# Patient Record
Sex: MALE | Race: WHITE | NOT HISPANIC OR LATINO | Employment: OTHER | URBAN - METROPOLITAN AREA
[De-identification: names, ages, dates, MRNs, and addresses within clinical notes are randomized per-mention and may not be internally consistent; named-entity substitution may affect disease eponyms.]

---

## 2017-10-18 PROBLEM — Z86.73 PERSONAL HISTORY OF TRANSIENT ISCHEMIC ATTACK (TIA), AND CEREBRAL INFARCTION WITHOUT RESIDUAL DEFICITS: Status: ACTIVE | Noted: 2017-10-18

## 2017-10-18 PROBLEM — I10 ESSENTIAL (PRIMARY) HYPERTENSION: Status: ACTIVE | Noted: 2017-10-18

## 2018-02-23 ENCOUNTER — APPOINTMENT (OUTPATIENT)
Dept: ONCOLOGY | Facility: CLINIC | Age: 68
End: 2018-02-23
Payer: COMMERCIAL

## 2018-02-23 ENCOUNTER — OFFICE VISIT (OUTPATIENT)
Dept: ONCOLOGY | Facility: CLINIC | Age: 68
End: 2018-02-23
Payer: COMMERCIAL

## 2018-02-23 VITALS
DIASTOLIC BLOOD PRESSURE: 93 MMHG | TEMPERATURE: 99.1 F | BODY MASS INDEX: 25.3 KG/M2 | HEART RATE: 58 BPM | WEIGHT: 166.4 LBS | OXYGEN SATURATION: 97 % | SYSTOLIC BLOOD PRESSURE: 194 MMHG

## 2018-02-23 DIAGNOSIS — C83.398 DIFFUSE LARGE B-CELL LYMPHOMA, EXTRANODAL AND SOLID ORGAN SITES: ICD-10-CM

## 2018-02-23 LAB
ALBUMIN SERPL-MCNC: 4.4 G/DL (ref 3.5–5.3)
ALP SERPL-CCNC: 104 U/L (ref 45–115)
ALT SERPL-CCNC: 17 U/L (ref 0–52)
ANION GAP SERPL CALC-SCNC: 8 MMOL/L (ref 3–11)
AST SERPL-CCNC: 19 U/L (ref 0–39)
BILIRUB SERPL-MCNC: 0.9 MG/DL (ref 0–1.4)
BUN SERPL-MCNC: 27 MG/DL (ref 7–25)
CALCIUM ALBUM COR SERPL-MCNC: 9.2 MG/DL (ref 8.5–10.1)
CALCIUM SERPL-MCNC: 9.5 MG/DL (ref 8.6–10.3)
CHLORIDE SERPL-SCNC: 103 MMOL/L (ref 98–107)
CO2 SERPL-SCNC: 27 MMOL/L (ref 21–32)
CREAT SERPL-MCNC: 1.4 MG/DL (ref 0.8–1.3)
GFR SERPL CREATININE-BSD FRML MDRD: 51 ML/MIN/1.73M*2
GLUCOSE SERPL-MCNC: 86 MG/DL (ref 70–105)
POTASSIUM SERPL-SCNC: 4 MMOL/L (ref 3.5–5.1)
PROT SERPL-MCNC: 7.2 G/DL (ref 6–8.3)
SODIUM SERPL-SCNC: 138 MMOL/L (ref 135–145)

## 2018-02-23 PROCEDURE — 80053 COMPREHEN METABOLIC PANEL: CPT

## 2018-02-23 PROCEDURE — 36415 COLL VENOUS BLD VENIPUNCTURE: CPT

## 2018-02-23 PROCEDURE — 99212 OFFICE O/P EST SF 10 MIN: CPT

## 2018-02-23 PROCEDURE — 99214 OFFICE O/P EST MOD 30 MIN: CPT | Performed by: INTERNAL MEDICINE

## 2018-02-23 ASSESSMENT — PAIN SCALES - GENERAL: PAINLEVEL: 0-NO PAIN

## 2018-02-23 NOTE — PROGRESS NOTES
Medical Oncology Follow-Up    Date of Service: 2/23/2018    Subjective   HISTORY OF PRESENT ILLNESS:  This is a 66-year-old male with non-Hodgkin's lymphoma.  The patient returns to  oncology clinic for followup.     The patient was diagnosed with non-Hodgkin's lymphoma when he underwent surgery  for small-bowel obstruction in February 2017.  He was found to have both  low-grade as well as diffuse large B-cell lymphoma involving the bowel.  When I  saw the patient in March 2017, his PET scan showed FDG avid large mesenteric  mass.  His bone marrow biopsy and brain imagings were negative.  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  Our purpose was to  eradicate the high-grade component of his lymphoma.  He received 6 cycles of R-CHOP between March and July 2017.  The PET scan done in August 2017 showed complete response.  Since then he has remained in remission.  Today patient returns oncology clinic for follow-up.    The patient states that in the last 3 months, he has done well.  He has not had any new health issues.  He denies any suspicious symptoms such as enlarging lymph nodes, fevers, chills, night sweats or unexplained weight loss.  He feels well with good energy level.      PAST MEDICAL HISTORY:   Past Medical History:   Diagnosis Date   • Diffuse large b-cell lymphoma, extranodal and solid organ sites (CMS/HCC)    • Hypertension    • Lymphoma (CMS/HCC)    • Personal history of transient ischemic attack         FAMILY HISTORY:   Family History   Problem Relation Age of Onset   • Multiple sclerosis Mother    • Heart disease Mother    • Lung cancer Father    • Prostate cancer Father    • Cancer Father         SOCIAL HISTORY:   Social History     Social History   • Marital status:      Spouse name: N/A   • Number of children: N/A   • Years of education: N/A     Occupational History   • Not on file.      Social History Main Topics   • Smoking status: Never Smoker   • Smokeless tobacco: Former User     Types: Chew      Comment: Age started: 22: age stopped: 51   • Alcohol use No   • Drug use: No   • Sexual activity: Not on file     Other Topics Concern   • Not on file     Social History Narrative   • No narrative on file        ALLERGIES:   Allergies as of 02/23/2018 - Reviewed 02/23/2018   Allergen Reaction Noted   • Shrimp  03/21/2017        MEDICATIONS:   Current Outpatient Prescriptions   Medication Sig Dispense Refill   • allopurinol (ZYLOPRIM) 300 mg tablet      • amLODIPine (NORVASC) 10 mg tablet      • aspirin 325 mg tablet      • lisinopril (PRINIVIL,ZESTRIL) 20 mg tablet      • losartan (COZAAR) 100 mg tablet      • metoprolol succinate XL (TOPROL-XL) 25 mg 24 hr tablet      • citalopram (CeleXA) 20 mg tablet      • dicloxacillin (DYNAPEN) 250 mg capsule      • docusate sodium (COLACE) 100 mg capsule      • LORazepam (ATIVAN) 0.5 mg tablet      • predniSONE (DELTASONE) 50 mg tablet      • prochlorperazine (COMPAZINE) 10 mg tablet      • sodium chloride (SALINE NASAL) 0.65 % nasal spray        No current facility-administered medications for this visit.        REVIEW OF SYSTEMS:   14 point systems review was done.  It was negative    The ECOG performance status today is 0          Objective    PHYSICAL EXAM:   BP (!) 194/93   Pulse 58   Temp 37.3 °C (99.1 °F) (Temporal)   Wt 75.5 kg (166 lb 6.4 oz)   SpO2 97%   BMI 25.30 kg/m²    Body mass index is 25.3 kg/m².     Overall patient looked well.  No distress    HEENT: No scleral icterus.  No oral or pharyngeal lesions    Ln: No lymphadenopathy in neck axilla or groin    Lungs: Clear    CVS: Regular rate and rhythm.  No S3.  No friction rub    Abdomen: Benign nontender.  No hepatosplenomegaly.  No palpable abnormalities    Ext: No edema.  Pulses intact    Musculoskeletal: No muscle or bone tenderness including spine pelvis    Skin: No petechia purpura  rash    Neuro: Alert oriented ×3.  No focal deficits        Physical Exam    DIAGNOSTIC REPORTS REVIEWED:   CBC today shows a white cell count of 6.0, hemoglobin of 13.8 and platelet count of 205,000 all within normal limits.  The comprehensive metabolic panel was normal with the exception of slightly elevated BUN and creatinine at 27 and 1.4.           Assessment/Plan    IMPRESSION and PLAN:   This is a 67-year-old male with history of non-Hodgkin's lymphoma.  The patient returns oncology clinic for follow-up.    The patient appears to have done well in the last 3 months.  He does not have signs and symptoms of recurrence of his lymphoma.  His examination is within normal limits.  His labs are also normal with the exception of slightly elevated BUN and creatinine which were normal 3 months ago in November 2017.  The patient has history of high blood pressure and apparently he did not take his blood pressure medication today as a result his blood pressure is quite high.  I suspect that uncontrolled blood pressure may have played a role in his mild kidney dysfunction.  I will advised him to take his blood pressure medication and contact his primary care provider for further workup and treatment for his kidney dysfunction as soon as possible.  Otherwise he seems to be doing well and appears to be in remission.  As a result I will see him back in 3 months for follow-up.    I had a fairly long conversation with the patient.  I reviewed his labs.  I told him that from the lymphoma standpoint he seems to be doing well.  We then spent some time talking about his kidney dysfunction and uncontrolled blood pressure and I advised him to take his blood pressure medication and contact his primary care physician as soon as possible.  I also answered his questions.  The patient verbalizes understanding and approval of my recommendations and ongoing therapy.  As a result he will return in 3 months for follow-up.                Physician: M. BEHNAN SAHIN, MD

## 2018-05-31 ENCOUNTER — APPOINTMENT (OUTPATIENT)
Dept: ONCOLOGY | Facility: CLINIC | Age: 68
End: 2018-05-31
Payer: COMMERCIAL

## 2018-05-31 ENCOUNTER — OFFICE VISIT (OUTPATIENT)
Dept: ONCOLOGY | Facility: CLINIC | Age: 68
End: 2018-05-31
Payer: COMMERCIAL

## 2018-05-31 VITALS
HEIGHT: 68 IN | BODY MASS INDEX: 25.01 KG/M2 | DIASTOLIC BLOOD PRESSURE: 89 MMHG | WEIGHT: 165 LBS | OXYGEN SATURATION: 100 % | HEART RATE: 62 BPM | TEMPERATURE: 98.7 F | SYSTOLIC BLOOD PRESSURE: 154 MMHG

## 2018-05-31 DIAGNOSIS — C83.398 DIFFUSE LARGE B-CELL LYMPHOMA, EXTRANODAL AND SOLID ORGAN SITES: ICD-10-CM

## 2018-05-31 PROCEDURE — 99212 OFFICE O/P EST SF 10 MIN: CPT

## 2018-05-31 PROCEDURE — 99214 OFFICE O/P EST MOD 30 MIN: CPT | Performed by: INTERNAL MEDICINE

## 2018-05-31 NOTE — PROGRESS NOTES
Medical Oncology Follow-Up    Date of Service: 5/31/2018    Subjective   HISTORY OF PRESENT ILLNESS:  This is a 66-year-old male with non-Hodgkin's lymphoma.  The patient returns to  oncology clinic for followup.     The patient was diagnosed with non-Hodgkin's lymphoma when he underwent surgery forsmall-bowel obstruction in February 2017.  He was found to have both low-grade as well as diffuse large B-cell lymphoma involving the bowel.  When I saw the patient in March 2017, his PET scan showed FDG avid large mesenteric  mass.  His bone marrow biopsy and brain imagings were negative.  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  Our purpose was to eradicate the high-grade component of his lymphoma.  He received 6 cycles of R-CHOP between March and July 2017.  The PET scan done in August 2017 showed complete response.  Since then he has remained in remission.  Today patient returns oncology clinic for follow-up.    The patient states that she has done well since he was seen in the clinic in February without any new health issues.  He denies any suspicious symptoms such as enlarging lymph nodes, fevers, chills, night sweats or weight loss.  He has good appetite and good energy level.  He states that his kidney dysfunction we discovered in February has improved with better blood pressure control.          PAST MEDICAL HISTORY:   Past Medical History:   Diagnosis Date   • Diffuse large b-cell lymphoma, extranodal and solid organ sites (CMS/HCC)    • Hypertension    • Lymphoma (CMS/HCC)    • Personal history of transient ischemic attack         FAMILY HISTORY:   Family History   Problem Relation Age of Onset   • Multiple sclerosis Mother    • Heart disease Mother    • Lung cancer Father    • Prostate cancer Father    • Cancer Father         SOCIAL HISTORY:   Social History     Social History   • Marital status:   "    Spouse name: N/A   • Number of children: N/A   • Years of education: N/A     Occupational History   • Not on file.     Social History Main Topics   • Smoking status: Never Smoker   • Smokeless tobacco: Former User     Types: Chew      Comment: Age started: 22: age stopped: 51   • Alcohol use No   • Drug use: No   • Sexual activity: Not on file     Other Topics Concern   • Not on file     Social History Narrative   • No narrative on file        ALLERGIES:   Allergies as of 05/31/2018 - Reviewed 05/31/2018   Allergen Reaction Noted   • Shrimp  03/21/2017        MEDICATIONS:   Current Outpatient Prescriptions   Medication Sig Dispense Refill   • allopurinol (ZYLOPRIM) 300 mg tablet      • amLODIPine (NORVASC) 10 mg tablet      • aspirin 325 mg tablet      • citalopram (CeleXA) 20 mg tablet      • dicloxacillin (DYNAPEN) 250 mg capsule      • docusate sodium (COLACE) 100 mg capsule      • lisinopril (PRINIVIL,ZESTRIL) 20 mg tablet      • LORazepam (ATIVAN) 0.5 mg tablet      • losartan (COZAAR) 100 mg tablet      • metoprolol succinate XL (TOPROL-XL) 25 mg 24 hr tablet      • predniSONE (DELTASONE) 50 mg tablet      • prochlorperazine (COMPAZINE) 10 mg tablet      • sodium chloride (SALINE NASAL) 0.65 % nasal spray        No current facility-administered medications for this visit.        REVIEW OF SYSTEMS:   A 14 point systems review was done.  It was negative    The ECOG performance status today is 0          Objective    PHYSICAL EXAM:   /89   Pulse 62   Temp 37.1 °C (98.7 °F) (Temporal)   Ht 1.727 m (5' 8\")   Wt 74.8 kg (165 lb)   SpO2 100%   BMI 25.09 kg/m²    Body mass index is 25.09 kg/m².     Overall patient looked well.  No distress     HEENT: No scleral icterus.  No oral or pharyngeal lesions     Ln: No lymphadenopathy in neck axilla or groin     Lungs: Clear     CVS: Regular rate and rhythm.  No S3.  No friction rub     Abdomen: Benign nontender.  No hepatosplenomegaly.  No palpable " abnormalities     Ext: No edema.  Pulses intact     Musculoskeletal: No muscle or bone tenderness including spine pelvis     Skin: No petechia purpura rash     Neuro: Alert oriented ×3.  No focal deficits      Physical Exam    DIAGNOSTIC REPORTS REVIEWED:   CBC today shows a white cell count of 6.7, hemoglobin of 13.6 and platelet count 196,000.  The comprehensive metabolic panel including LDH, BUN and creatinine was normal.           Assessment/Plan    IMPRESSION and PLAN:   This is a 67-year-old male with non-Hodgkin's lymphoma.  The patient returns oncology clinic for follow-up.    The patient appears to have done well in the last 3 months since he was last seen in the clinic.  He has not developed any new health issues.  His mild kidney dysfunction has improved presumably with better blood pressure control and his kidney function is back to normal.  His examination is unremarkable.  His labs are within normal limits.  As a result, he continues to remain in remission nearly 1 year after the completion of his chemotherapy.  I will continue to see him every 3 months for another year after which we will decrease the frequency of clinic visits.  As discussed before, we will not obtain any routine imaging studies during follow-up.    I had a fairly long conversation with the patient.  I reviewed his labs.  I told him that his kidney dysfunction has resolved which she was pleased to hear.  Once again I reviewed the follow-up plan and answered his questions.  The patient verbalizes understanding and approval of ongoing management and my recommendations.  As a result he will return to oncology clinic in 3 months for follow-up.               Physician: M. BEHNAN SAHIN, MD

## 2018-08-30 ENCOUNTER — OFFICE VISIT (OUTPATIENT)
Dept: ONCOLOGY | Facility: CLINIC | Age: 68
End: 2018-08-30
Payer: COMMERCIAL

## 2018-08-30 ENCOUNTER — APPOINTMENT (OUTPATIENT)
Dept: ONCOLOGY | Facility: CLINIC | Age: 68
End: 2018-08-30
Payer: COMMERCIAL

## 2018-08-30 VITALS
WEIGHT: 165 LBS | OXYGEN SATURATION: 98 % | BODY MASS INDEX: 25.01 KG/M2 | SYSTOLIC BLOOD PRESSURE: 165 MMHG | TEMPERATURE: 98.1 F | HEIGHT: 68 IN | DIASTOLIC BLOOD PRESSURE: 89 MMHG | HEART RATE: 58 BPM

## 2018-08-30 DIAGNOSIS — C83.398 DIFFUSE LARGE B-CELL LYMPHOMA, EXTRANODAL AND SOLID ORGAN SITES: ICD-10-CM

## 2018-08-30 LAB
ALBUMIN SERPL-MCNC: 4.2 G/DL (ref 3.5–5.3)
ALP SERPL-CCNC: 94 U/L (ref 45–115)
ALT SERPL-CCNC: 12 U/L (ref 0–52)
ANION GAP SERPL CALC-SCNC: 7 MMOL/L (ref 3–11)
AST SERPL-CCNC: 17 U/L (ref 0–39)
BASOPHILS # BLD AUTO: 0.1 10*3/UL
BASOPHILS NFR BLD AUTO: 1 % (ref 0–2)
BILIRUB SERPL-MCNC: 1.32 MG/DL (ref 0–1.4)
BUN SERPL-MCNC: 22 MG/DL (ref 7–25)
CALCIUM ALBUM COR SERPL-MCNC: 8.9 MG/DL (ref 8.5–10.1)
CALCIUM SERPL-MCNC: 9.1 MG/DL (ref 8.6–10.3)
CHLORIDE SERPL-SCNC: 102 MMOL/L (ref 98–107)
CO2 SERPL-SCNC: 26 MMOL/L (ref 21–32)
CREAT SERPL-MCNC: 1.2 MG/DL (ref 0.8–1.3)
EOSINOPHIL # BLD AUTO: 0.4 10*3/UL
EOSINOPHIL NFR BLD AUTO: 6 % (ref 0–3)
ERYTHROCYTE [DISTWIDTH] IN BLOOD BY AUTOMATED COUNT: 13 % (ref 11.5–15)
GFR SERPL CREATININE-BSD FRML MDRD: 61 ML/MIN/1.73M*2
GLUCOSE SERPL-MCNC: 87 MG/DL (ref 70–105)
HCT VFR BLD AUTO: 38.3 % (ref 38–50)
HGB BLD-MCNC: 13.9 G/DL (ref 13.2–17.2)
LDH SERPL L TO P-CCNC: 137 U/L (ref 87–246)
LYMPHOCYTES # BLD AUTO: 1.7 10*3/UL
LYMPHOCYTES NFR BLD AUTO: 30 % (ref 15–47)
MCH RBC QN AUTO: 32.7 PG (ref 29–34)
MCHC RBC AUTO-ENTMCNC: 36.3 G/DL (ref 32–36)
MCV RBC AUTO: 90 FL (ref 82–97)
MONOCYTES # BLD AUTO: 0.9 10*3/UL
MONOCYTES NFR BLD AUTO: 16 % (ref 5–13)
NEUTROPHILS # BLD AUTO: 2.7 10*3/UL
NEUTROPHILS NFR BLD AUTO: 47 % (ref 46–70)
PLATELET # BLD AUTO: 194 10*3/UL (ref 130–350)
PMV BLD AUTO: 7.6 FL (ref 6.9–10.8)
POTASSIUM SERPL-SCNC: 4.3 MMOL/L (ref 3.5–5.1)
PROT SERPL-MCNC: 7.1 G/DL (ref 6–8.3)
RBC # BLD AUTO: 4.26 10*6/ΜL (ref 4.1–5.8)
SODIUM SERPL-SCNC: 135 MMOL/L (ref 135–145)
WBC # BLD AUTO: 5.8 10*3/UL (ref 3.7–9.6)

## 2018-08-30 PROCEDURE — 99212 OFFICE O/P EST SF 10 MIN: CPT

## 2018-08-30 PROCEDURE — 85025 COMPLETE CBC W/AUTO DIFF WBC: CPT

## 2018-08-30 PROCEDURE — 83615 LACTATE (LD) (LDH) ENZYME: CPT

## 2018-08-30 PROCEDURE — 36415 COLL VENOUS BLD VENIPUNCTURE: CPT

## 2018-08-30 PROCEDURE — 82565 ASSAY OF CREATININE: CPT

## 2018-08-30 PROCEDURE — 99214 OFFICE O/P EST MOD 30 MIN: CPT | Performed by: INTERNAL MEDICINE

## 2018-08-30 RX ORDER — ZOSTER VACCINE RECOMBINANT, ADJUVANTED 50 MCG/0.5
KIT INTRAMUSCULAR
COMMUNITY
Start: 2018-06-15 | End: 2019-11-27 | Stop reason: ALTCHOICE

## 2018-08-30 RX ORDER — FISH OIL/DHA/EPA 1200-144MG
1200 CAPSULE ORAL 2 TIMES DAILY
COMMUNITY
End: 2021-03-08 | Stop reason: ALTCHOICE

## 2018-08-30 ASSESSMENT — PAIN SCALES - GENERAL: PAINLEVEL: 0-NO PAIN

## 2018-08-30 NOTE — PROGRESS NOTES
Medical Oncology Follow-Up    Date of Service: 8/30/2018    Subjective   HISTORY OF PRESENT ILLNESS:  This is a 67-year-old male with non-Hodgkin's lymphoma.  The patient returns to  oncology clinic for followup.     The patient was diagnosed with non-Hodgkin's lymphoma when he underwent surgery forsmall-bowel obstruction in February 2017.  He was found to have both low-grade as well as diffuse large B-cell lymphoma involving the bowel.  When I saw the patient in March 2017, his PET scan showed FDG avid large mesenteric mass.  His bone marrow biopsy and brain imagings were negative.  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  Our purpose was to eradicate the high-grade component of his lymphoma.  He received 6 cycles of R-CHOP between March and July 2017.  The PET scan done in August 2017 showed complete response.  Since then he has remained in remission.  Today patient returns oncology clinic for follow-up.      The patient states that he has done well in the last 3 months without any new health issues.  He denies any suspicious symptoms such as enlarging lymph nodes, fevers, chills, night sweats or weight loss.  He also denies any abdominal complaints such as pain, nausea, vomiting, diarrhea, constipation, hematemesis, melena or bright red blood per rectum.     PAST MEDICAL HISTORY:   Past Medical History:   Diagnosis Date   • Diffuse large b-cell lymphoma, extranodal and solid organ sites (CMS/HCC)    • Hypertension    • Lymphoma (CMS/HCC)    • Personal history of transient ischemic attack         FAMILY HISTORY:   Family History   Problem Relation Age of Onset   • Multiple sclerosis Mother    • Heart disease Mother    • Lung cancer Father    • Prostate cancer Father    • Cancer Father         SOCIAL HISTORY:   Social History     Social History   • Marital status:      Spouse name: N/A   • Number of  "children: N/A   • Years of education: N/A     Occupational History   • Not on file.     Social History Main Topics   • Smoking status: Never Smoker   • Smokeless tobacco: Former User     Types: Chew      Comment: Age started: 22: age stopped: 51   • Alcohol use No   • Drug use: No   • Sexual activity: Not on file     Other Topics Concern   • Not on file     Social History Narrative   • No narrative on file        ALLERGIES:   Allergies as of 08/30/2018 - Reviewed 08/30/2018   Allergen Reaction Noted   • Shrimp  03/21/2017        MEDICATIONS:   Current Outpatient Prescriptions   Medication Sig Dispense Refill   • amLODIPine (NORVASC) 10 mg tablet      • aspirin 325 mg tablet      • citalopram (CeleXA) 20 mg tablet      • fish oil-dha-epa 1,200-144-216 mg capsule Take 1,200 mg by mouth 2 (two) times a day.     • lisinopril (PRINIVIL,ZESTRIL) 20 mg tablet      • allopurinol (ZYLOPRIM) 300 mg tablet      • dicloxacillin (DYNAPEN) 250 mg capsule      • docusate sodium (COLACE) 100 mg capsule      • LORazepam (ATIVAN) 0.5 mg tablet      • losartan (COZAAR) 100 mg tablet      • metoprolol succinate XL (TOPROL-XL) 25 mg 24 hr tablet      • predniSONE (DELTASONE) 50 mg tablet      • prochlorperazine (COMPAZINE) 10 mg tablet      • SHINGRIX, PF, 50 mcg/0.5 mL suspension for reconstitution vaccine      • sodium chloride (SALINE NASAL) 0.65 % nasal spray        No current facility-administered medications for this visit.        REVIEW OF SYSTEMS:   A 14 point systems review was done.  It was negative.    The ECOG performance status today is 0          Objective    PHYSICAL EXAM:   /89   Pulse 58   Temp 36.7 °C (98.1 °F) (Temporal)   Ht 1.727 m (5' 8\")   Wt 74.8 kg (165 lb)   SpO2 98%   BMI 25.09 kg/m²    Body mass index is 25.09 kg/m².     Overall patient looked well.  No distress     HEENT: No scleral icterus.  No oral or pharyngeal lesions     Ln: No lymphadenopathy in neck axilla or " groin     Lungs: Clear     CVS: Regular rate and rhythm.  No S3.  No friction rub     Abdomen: Benign nontender.  No hepatosplenomegaly.  No palpable abnormalities     Ext: No edema.  Pulses intact     Musculoskeletal: No muscle or bone tenderness including spine pelvis     Skin: No petechia purpura rash     Neuro: Alert oriented ×3.  No focal deficits         Physical Exam    DIAGNOSTIC REPORTS REVIEWED:   CBC today shows a white cell count of 5.8, hemoglobin of 13.9 and platelet count 194,000.  Plans metabolic panel and LDH were normal.           Assessment/Plan    IMPRESSION and PLAN:   This is a 67-year-old male with history of non-Hodgkin's lymphoma.  The patient returns oncology clinic for follow-up.    The patient appears to have done well in the last 3 months without any new health issues.  He does not have signs and symptoms of recurrence of his lymphoma.  His examination is unremarkable.  His labs are within normal limits including his LDH.  Therefore clinically he appears to be in remission 12 months after the completion of his treatment.  As there is substantial risk of recurrence, we will continue to follow him.  I will see him every 3 months for another year after which we will reduce the frequency of clinic visits.  We will obtain imaging studies only if there are suspicious signs or symptoms or laboratory findings.    I had a fairly long conversation with the patient.  I reviewed his labs.  Once again I reviewed the follow-up plan and answered his questions.  The patient verbalizes understanding and approval of ongoing management and my recommendations.  As a result, he will return to oncology clinic in 3 months for follow-up.               Physician: M. BEHNAN SAHIN, MD

## 2018-11-26 ENCOUNTER — APPOINTMENT (OUTPATIENT)
Dept: ONCOLOGY | Facility: CLINIC | Age: 68
End: 2018-11-26
Payer: COMMERCIAL

## 2018-11-26 ENCOUNTER — OFFICE VISIT (OUTPATIENT)
Dept: ONCOLOGY | Facility: CLINIC | Age: 68
End: 2018-11-26
Payer: COMMERCIAL

## 2018-11-26 VITALS
DIASTOLIC BLOOD PRESSURE: 91 MMHG | HEART RATE: 57 BPM | SYSTOLIC BLOOD PRESSURE: 169 MMHG | BODY MASS INDEX: 24.95 KG/M2 | OXYGEN SATURATION: 98 % | HEIGHT: 68 IN | WEIGHT: 164.6 LBS | TEMPERATURE: 98.8 F

## 2018-11-26 DIAGNOSIS — C83.398 DIFFUSE LARGE B-CELL LYMPHOMA, EXTRANODAL AND SOLID ORGAN SITES: ICD-10-CM

## 2018-11-26 LAB
ALBUMIN SERPL-MCNC: 4.4 G/DL (ref 3.5–5.3)
ALP SERPL-CCNC: 108 U/L (ref 45–115)
ALT SERPL-CCNC: 12 U/L (ref 0–52)
ANION GAP SERPL CALC-SCNC: 6 MMOL/L (ref 3–11)
AST SERPL-CCNC: 16 U/L (ref 0–39)
BASOPHILS # BLD AUTO: 0 10*3/UL
BASOPHILS NFR BLD AUTO: 1 % (ref 0–2)
BILIRUB SERPL-MCNC: 1.05 MG/DL (ref 0–1.4)
BUN SERPL-MCNC: 16 MG/DL (ref 7–25)
CALCIUM ALBUM COR SERPL-MCNC: 9 MG/DL (ref 8.6–10.3)
CALCIUM SERPL-MCNC: 9.3 MG/DL (ref 8.6–10.3)
CHLORIDE SERPL-SCNC: 105 MMOL/L (ref 98–107)
CO2 SERPL-SCNC: 26 MMOL/L (ref 21–32)
CREAT SERPL-MCNC: 1.1 MG/DL (ref 0.7–1.3)
EOSINOPHIL # BLD AUTO: 0.2 10*3/UL
EOSINOPHIL NFR BLD AUTO: 4 % (ref 0–3)
ERYTHROCYTE [DISTWIDTH] IN BLOOD BY AUTOMATED COUNT: 12.8 % (ref 11.5–15)
GFR SERPL CREATININE-BSD FRML MDRD: 69 ML/MIN/1.73M*2
GLUCOSE SERPL-MCNC: 99 MG/DL (ref 70–105)
HCT VFR BLD AUTO: 42 % (ref 38–50)
HGB BLD-MCNC: 14.8 G/DL (ref 13.2–17.2)
LDH SERPL L TO P-CCNC: 125 U/L (ref 87–246)
LYMPHOCYTES # BLD AUTO: 1.5 10*3/UL
LYMPHOCYTES NFR BLD AUTO: 29 % (ref 15–47)
MCH RBC QN AUTO: 32 PG (ref 29–34)
MCHC RBC AUTO-ENTMCNC: 35.2 G/DL (ref 32–36)
MCV RBC AUTO: 90.9 FL (ref 82–97)
MONOCYTES # BLD AUTO: 0.7 10*3/UL
MONOCYTES NFR BLD AUTO: 13 % (ref 5–13)
NEUTROPHILS # BLD AUTO: 2.8 10*3/UL
NEUTROPHILS NFR BLD AUTO: 53 % (ref 46–70)
PLATELET # BLD AUTO: 188 10*3/UL (ref 130–350)
PMV BLD AUTO: 8 FL (ref 6.9–10.8)
POTASSIUM SERPL-SCNC: 4.2 MMOL/L (ref 3.5–5.1)
PROT SERPL-MCNC: 6.9 G/DL (ref 6–8.3)
RBC # BLD AUTO: 4.62 10*6/ΜL (ref 4.1–5.8)
SODIUM SERPL-SCNC: 137 MMOL/L (ref 135–145)
WBC # BLD AUTO: 5.3 10*3/UL (ref 3.7–9.6)

## 2018-11-26 PROCEDURE — G0463 HOSPITAL OUTPT CLINIC VISIT: HCPCS

## 2018-11-26 PROCEDURE — 99214 OFFICE O/P EST MOD 30 MIN: CPT | Performed by: INTERNAL MEDICINE

## 2018-11-26 PROCEDURE — 80053 COMPREHEN METABOLIC PANEL: CPT

## 2018-11-26 PROCEDURE — 36415 COLL VENOUS BLD VENIPUNCTURE: CPT

## 2018-11-26 PROCEDURE — 83615 LACTATE (LD) (LDH) ENZYME: CPT

## 2018-11-26 PROCEDURE — 85025 COMPLETE CBC W/AUTO DIFF WBC: CPT

## 2018-11-26 ASSESSMENT — PAIN SCALES - GENERAL: PAINLEVEL: 0-NO PAIN

## 2018-11-26 NOTE — PROGRESS NOTES
Medical Oncology Follow-Up    Date of Service: 11/26/2018    Subjective   HISTORY OF PRESENT ILLNESS:  This is a 68-year-old male with non-Hodgkin's lymphoma.  The patient returns to  oncology clinic for followup.     The patient was diagnosed with non-Hodgkin's lymphoma when he underwent surgery for a small-bowel obstruction in February 2017.  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  His PET scan showed FDG avid large mesenteric mass.  His bone marrow biopsy and brain imagings were negative.  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  Our purpose was to eradicate the high-grade component of his lymphoma.  He received 6 cycles of R-CHOP between March and July 2017.  The PET scan done in August 2017 showed complete response.  Since then he has remained in remission.  Today patient returns oncology clinic for follow-up.    The patient states that he has done well over the last 3 months without any health issues.  He denies any suspicious symptoms such as enlarging lymph nodes, fevers, chills, night sweats or weight loss.  He also denies any abdominal complaints.  In general he feels well with good appetite.                PAST MEDICAL HISTORY:   Past Medical History:   Diagnosis Date   • Diffuse large b-cell lymphoma, extranodal and solid organ sites (CMS/HCC) (HCC)    • Hypertension    • Lymphoma (CMS/HCC) (HCC)    • Personal history of transient ischemic attack         FAMILY HISTORY:   Family History   Problem Relation Age of Onset   • Multiple sclerosis Mother    • Heart disease Mother    • Lung cancer Father    • Prostate cancer Father    • Cancer Father         SOCIAL HISTORY:   Social History     Socioeconomic History   • Marital status:      Spouse name: Not on file   • Number of children: Not on file   • Years of education: Not on file  "  • Highest education level: Not on file   Social Needs   • Financial resource strain: Not on file   • Food insecurity - worry: Not on file   • Food insecurity - inability: Not on file   • Transportation needs - medical: Not on file   • Transportation needs - non-medical: Not on file   Occupational History   • Not on file   Tobacco Use   • Smoking status: Never Smoker   • Smokeless tobacco: Former User     Types: Chew   • Tobacco comment: Age started: 22: age stopped: 51   Substance and Sexual Activity   • Alcohol use: No   • Drug use: No   • Sexual activity: Not on file   Other Topics Concern   • Not on file   Social History Narrative   • Not on file        ALLERGIES:   Allergies as of 11/26/2018 - Reviewed 11/26/2018   Allergen Reaction Noted   • Shrimp  03/21/2017        MEDICATIONS:   Current Outpatient Medications   Medication Sig Dispense Refill   • amLODIPine (NORVASC) 10 mg tablet      • aspirin 325 mg tablet      • citalopram (CeleXA) 20 mg tablet      • fish oil-dha-epa 1,200-144-216 mg capsule Take 1,200 mg by mouth 2 (two) times a day.     • lisinopril (PRINIVIL,ZESTRIL) 20 mg tablet      • metoprolol succinate XL (TOPROL-XL) 25 mg 24 hr tablet      • SHINGRIX, PF, 50 mcg/0.5 mL suspension for reconstitution vaccine      • allopurinol (ZYLOPRIM) 300 mg tablet      • dicloxacillin (DYNAPEN) 250 mg capsule      • docusate sodium (COLACE) 100 mg capsule      • LORazepam (ATIVAN) 0.5 mg tablet      • losartan (COZAAR) 100 mg tablet      • prochlorperazine (COMPAZINE) 10 mg tablet        No current facility-administered medications for this visit.        REVIEW OF SYSTEMS:   A 14 point systems review was done.  It was negative    The ECOG performance status today is 0          Objective    PHYSICAL EXAM:   /91   Pulse 57   Temp 37.1 °C (98.8 °F) (Temporal)   Ht 1.727 m (5' 8\")   Wt 74.7 kg (164 lb 9.6 oz)   SpO2 98%   BMI 25.03 kg/m²    Body mass index is 25.03 kg/m².     Patient looked well.  No " distress     HEENT: No scleral icterus.  No oral or pharyngeal lesions     Ln: No lymphadenopathy in neck axilla or groin     Lungs: Clear.  No rales rhonchi or wheezing     CVS: Regular rate and rhythm.  No S3.  No friction rub     Abdomen: Benign nontender.  No hepatosplenomegaly.  No palpable abnormalities     Ext: No edema.  Pulses intact     Musculoskeletal: No muscle or bone tenderness including spine pelvis     Skin: No petechia purpura rash     Neuro: Alert oriented ×3.  No focal deficits               Physical Exam    DIAGNOSTIC REPORTS REVIEWED:   CBC, the compressive metabolic panel and LDH were normal today           Assessment/Plan    IMPRESSION and PLAN:   This is a 68-year-old male with non-Hodgkin's lymphoma.  The patient returns oncology clinic for follow-up.    The patient has done well clinically in the last 3 months without any health issues.  He does not have signs and symptoms of recurrence of his lymphoma.  His examination is unremarkable.  His labs are within normal limits.  Therefore he appears to be clinically in remission.  My intention is to follow him every 3 months until the summer 2019 after which we will start seeing him every 6 months until he has completed 5 years of surveillance which will be in July 2022.    I had a fairly long conversation with the patient.  I reviewed his labs.  Once again I reviewed the follow-up plan and answered his questions.  The patient verbalizes understanding and approval of ongoing management and my recommendations.  As a result, he will return to oncology clinic in 3 months for follow-up.               Physician: M. BEHNAN SAHIN, MD

## 2019-02-26 ENCOUNTER — APPOINTMENT (OUTPATIENT)
Dept: ONCOLOGY | Facility: CLINIC | Age: 69
End: 2019-02-26
Payer: COMMERCIAL

## 2019-02-26 ENCOUNTER — OFFICE VISIT (OUTPATIENT)
Dept: ONCOLOGY | Facility: CLINIC | Age: 69
End: 2019-02-26
Payer: COMMERCIAL

## 2019-02-26 VITALS
BODY MASS INDEX: 25.88 KG/M2 | HEIGHT: 68 IN | DIASTOLIC BLOOD PRESSURE: 88 MMHG | WEIGHT: 170.8 LBS | SYSTOLIC BLOOD PRESSURE: 196 MMHG | HEART RATE: 64 BPM | OXYGEN SATURATION: 98 % | TEMPERATURE: 98.2 F

## 2019-02-26 DIAGNOSIS — C83.398 DIFFUSE LARGE B-CELL LYMPHOMA, EXTRANODAL AND SOLID ORGAN SITES: ICD-10-CM

## 2019-02-26 LAB
ALBUMIN SERPL-MCNC: 4.4 G/DL (ref 3.5–5.3)
ALP SERPL-CCNC: 92 U/L (ref 45–115)
ALT SERPL-CCNC: 16 U/L (ref 0–52)
ANION GAP SERPL CALC-SCNC: 8 MMOL/L (ref 3–11)
AST SERPL-CCNC: 19 U/L (ref 0–39)
BASOPHILS # BLD AUTO: 0.1 10*3/UL
BASOPHILS NFR BLD AUTO: 1 % (ref 0–2)
BILIRUB SERPL-MCNC: 1.22 MG/DL (ref 0–1.4)
BUN SERPL-MCNC: 17 MG/DL (ref 7–25)
CALCIUM ALBUM COR SERPL-MCNC: 9.6 MG/DL (ref 8.6–10.3)
CALCIUM SERPL-MCNC: 9.9 MG/DL (ref 8.6–10.3)
CHLORIDE SERPL-SCNC: 104 MMOL/L (ref 98–107)
CO2 SERPL-SCNC: 29 MMOL/L (ref 21–32)
CREAT SERPL-MCNC: 1.18 MG/DL (ref 0.7–1.3)
EOSINOPHIL # BLD AUTO: 0.2 10*3/UL
EOSINOPHIL NFR BLD AUTO: 5 % (ref 0–3)
ERYTHROCYTE [DISTWIDTH] IN BLOOD BY AUTOMATED COUNT: 13.2 % (ref 11.5–15)
GFR SERPL CREATININE-BSD FRML MDRD: 63 ML/MIN/1.73M*2
GLUCOSE SERPL-MCNC: 84 MG/DL (ref 70–105)
HCT VFR BLD AUTO: 42.7 % (ref 38–50)
HGB BLD-MCNC: 14.8 G/DL (ref 13.2–17.2)
LDH SERPL L TO P-CCNC: 147 U/L (ref 87–246)
LYMPHOCYTES # BLD AUTO: 1.3 10*3/UL
LYMPHOCYTES NFR BLD AUTO: 25 % (ref 15–47)
MCH RBC QN AUTO: 31.8 PG (ref 29–34)
MCHC RBC AUTO-ENTMCNC: 34.7 G/DL (ref 32–36)
MCV RBC AUTO: 91.7 FL (ref 82–97)
MONOCYTES # BLD AUTO: 0.8 10*3/UL
MONOCYTES NFR BLD AUTO: 14 % (ref 5–13)
NEUTROPHILS # BLD AUTO: 2.9 10*3/UL
NEUTROPHILS NFR BLD AUTO: 55 % (ref 46–70)
PLATELET # BLD AUTO: 225 10*3/UL (ref 130–350)
PMV BLD AUTO: 8 FL (ref 6.9–10.8)
POTASSIUM SERPL-SCNC: 4.4 MMOL/L (ref 3.5–5.1)
PROT SERPL-MCNC: 7.3 G/DL (ref 6–8.3)
RBC # BLD AUTO: 4.66 10*6/ΜL (ref 4.1–5.8)
SODIUM SERPL-SCNC: 141 MMOL/L (ref 135–145)
WBC # BLD AUTO: 5.3 10*3/UL (ref 3.7–9.6)

## 2019-02-26 PROCEDURE — 36415 COLL VENOUS BLD VENIPUNCTURE: CPT

## 2019-02-26 PROCEDURE — 85025 COMPLETE CBC W/AUTO DIFF WBC: CPT

## 2019-02-26 PROCEDURE — 80053 COMPREHEN METABOLIC PANEL: CPT

## 2019-02-26 PROCEDURE — 83615 LACTATE (LD) (LDH) ENZYME: CPT

## 2019-02-26 PROCEDURE — G0463 HOSPITAL OUTPT CLINIC VISIT: HCPCS

## 2019-02-26 PROCEDURE — 99214 OFFICE O/P EST MOD 30 MIN: CPT | Performed by: INTERNAL MEDICINE

## 2019-02-26 ASSESSMENT — PAIN SCALES - GENERAL: PAINLEVEL: 0-NO PAIN

## 2019-02-26 NOTE — PROGRESS NOTES
Medical Oncology Follow-Up    Date of Service: 2/26/2019    Subjective   HISTORY OF PRESENT ILLNESS:  This is a 68-year-old male with non-Hodgkin's lymphoma.  The patient returns to  oncology clinic for followup.     The patient was diagnosed with non-Hodgkin's lymphoma when he underwent surgery for a small-bowel obstruction in February 2017.  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  His PET scan showed FDG avid large mesenteric mass.  His bone marrow biopsy and brain imagings were negative.  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  Our purpose was to eradicate the high-grade component of his lymphoma.  He received 6 cycles of R-CHOP between March and July 2017.  The PET scan done in August 2017 showed complete response.  Since then patient has remained in remission.  Today patient returns oncology clinic for follow-up.    Patient states that he has not had any health issues in the last 3 months.  He has not noticed any suspicious symptoms such as enlarging lymph nodes, fevers, chills, night sweats, weight loss or fatigue.  His blood pressure today is high and he indicates that he ran out of 1 of his blood pressure medications and did not take it for 3 days but he is going to  his prescription today.  He denies any headache, visual disturbances.          PAST MEDICAL HISTORY:   Past Medical History:   Diagnosis Date   • Diffuse large b-cell lymphoma, extranodal and solid organ sites (CMS/HCC) (HCC)    • Hypertension    • Lymphoma (CMS/HCC) (HCC)    • Personal history of transient ischemic attack         FAMILY HISTORY:   Family History   Problem Relation Age of Onset   • Multiple sclerosis Mother    • Heart disease Mother    • Lung cancer Father    • Prostate cancer Father    • Cancer Father         SOCIAL HISTORY:   Social History      Socioeconomic History   • Marital status:      Spouse name: Not on file   • Number of children: Not on file   • Years of education: Not on file   • Highest education level: Not on file   Social Needs   • Financial resource strain: Not on file   • Food insecurity - worry: Not on file   • Food insecurity - inability: Not on file   • Transportation needs - medical: Not on file   • Transportation needs - non-medical: Not on file   Occupational History   • Not on file   Tobacco Use   • Smoking status: Never Smoker   • Smokeless tobacco: Former User     Types: Chew   • Tobacco comment: Age started: 22: age stopped: 51   Substance and Sexual Activity   • Alcohol use: No   • Drug use: No   • Sexual activity: Not on file   Other Topics Concern   • Not on file   Social History Narrative   • Not on file        ALLERGIES:   Allergies as of 02/26/2019 - Reviewed 02/26/2019   Allergen Reaction Noted   • Shrimp  03/21/2017        MEDICATIONS:   Current Outpatient Medications   Medication Sig Dispense Refill   • allopurinol (ZYLOPRIM) 300 mg tablet      • amLODIPine (NORVASC) 10 mg tablet      • aspirin 325 mg tablet      • citalopram (CeleXA) 20 mg tablet      • dicloxacillin (DYNAPEN) 250 mg capsule      • docusate sodium (COLACE) 100 mg capsule      • fish oil-dha-epa 1,200-144-216 mg capsule Take 1,200 mg by mouth 2 (two) times a day.     • lisinopril (PRINIVIL,ZESTRIL) 20 mg tablet      • LORazepam (ATIVAN) 0.5 mg tablet      • losartan (COZAAR) 100 mg tablet      • metoprolol succinate XL (TOPROL-XL) 25 mg 24 hr tablet      • prochlorperazine (COMPAZINE) 10 mg tablet      • SHINGRIX, PF, 50 mcg/0.5 mL suspension for reconstitution vaccine        No current facility-administered medications for this visit.        REVIEW OF SYSTEMS:   A 14 point systems review was done.  It was negative    The ECOG performance status today is 0          Objective    PHYSICAL EXAM:   BP (!) 196/88   Pulse 64   Temp 36.8 °C (98.2 °F)  "(Temporal)   Ht 1.727 m (5' 8\")   Wt 77.5 kg (170 lb 12.8 oz)   SpO2 98%   BMI 25.97 kg/m²    Body mass index is 25.97 kg/m².     Patient looked well.  No distress     HEENT: No scleral icterus.  No oral or pharyngeal lesions     Ln: No lymphadenopathy in neck axilla or groin     Lungs: Clear.  No rales rhonchi or wheezing     CVS: Regular rate and rhythm.  No S3.  No friction rub     Abdomen: Benign nontender.  No hepatosplenomegaly.  No palpable abnormalities     Ext: No edema.  Pulses intact     Musculoskeletal: No muscle or bone tenderness including spine pelvis     Skin: No petechia purpura rash     Neuro: Alert oriented ×3.  No focal deficits            Physical Exam    DIAGNOSTIC REPORTS REVIEWED:   CBC, the compressive metabolic panel and LDH were normal today.           Assessment/Plan    IMPRESSION and PLAN:   This is a 68-year-old male with non-Hodgkin's lymphoma.  The patient returns to oncology clinic for follow-up.    Patient appears to have done well in the last 3 months without any health issues.  He does not have signs and symptoms of recurrence of her lymphoma including the low-grade component.  His physical exam is unremarkable other than elevated blood pressure caused by missing his blood pressure medication.  But he does not have signs of malignant hypertension and he is planning to  his prescription today.  His labs are within normal limits.  As a result clinically 2 years after his diagnosis he remains in remission.  I will see him every 3 months until the summer of this year when he finishes 2 years of surveillance after the completion of his chemotherapy.  Afterwards we will start seeing him every 6 months for an additional 3 years.    I had a fairly long conversation with the patient.  I reviewed his labs.  We talked about his elevated blood pressure and I encouraged him to fill his prescription and start taking his blood pressure medication right away.  Once again I reviewed the " follow-up plan and answered his questions.  The patient verbalized understanding and approval of ongoing management and my recommendations.  As a result, he will return to oncology clinic in 3 months for follow-up.               Physician: M. BEHNAN SAHIN, MD

## 2019-05-28 ENCOUNTER — APPOINTMENT (OUTPATIENT)
Dept: ONCOLOGY | Facility: CLINIC | Age: 69
End: 2019-05-28
Payer: COMMERCIAL

## 2019-05-28 ENCOUNTER — OFFICE VISIT (OUTPATIENT)
Dept: ONCOLOGY | Facility: CLINIC | Age: 69
End: 2019-05-28
Payer: COMMERCIAL

## 2019-05-28 VITALS
HEART RATE: 53 BPM | BODY MASS INDEX: 25.21 KG/M2 | SYSTOLIC BLOOD PRESSURE: 192 MMHG | TEMPERATURE: 97.7 F | OXYGEN SATURATION: 96 % | WEIGHT: 165.8 LBS | DIASTOLIC BLOOD PRESSURE: 93 MMHG

## 2019-05-28 DIAGNOSIS — C83.398 DIFFUSE LARGE B-CELL LYMPHOMA, EXTRANODAL AND SOLID ORGAN SITES: ICD-10-CM

## 2019-05-28 LAB
ALBUMIN SERPL-MCNC: 4.2 G/DL (ref 3.5–5.3)
ALP SERPL-CCNC: 106 U/L (ref 45–115)
ALT SERPL-CCNC: 28 U/L (ref 0–52)
ANION GAP SERPL CALC-SCNC: 6 MMOL/L (ref 3–11)
AST SERPL-CCNC: 25 U/L (ref 0–39)
BASOPHILS # BLD AUTO: 0.1 10*3/UL
BASOPHILS NFR BLD AUTO: 1 % (ref 0–2)
BILIRUB SERPL-MCNC: 1.05 MG/DL (ref 0–1.4)
BUN SERPL-MCNC: 25 MG/DL (ref 7–25)
CALCIUM ALBUM COR SERPL-MCNC: 8.8 MG/DL (ref 8.6–10.3)
CALCIUM SERPL-MCNC: 9 MG/DL (ref 8.6–10.3)
CHLORIDE SERPL-SCNC: 106 MMOL/L (ref 98–107)
CO2 SERPL-SCNC: 24 MMOL/L (ref 21–32)
CREAT SERPL-MCNC: 1.15 MG/DL (ref 0.7–1.3)
EOSINOPHIL # BLD AUTO: 0.3 10*3/UL
EOSINOPHIL NFR BLD AUTO: 6 % (ref 0–3)
ERYTHROCYTE [DISTWIDTH] IN BLOOD BY AUTOMATED COUNT: 13.2 % (ref 11.5–15)
GFR SERPL CREATININE-BSD FRML MDRD: 65 ML/MIN/1.73M*2
GLUCOSE SERPL-MCNC: 112 MG/DL (ref 70–105)
HCT VFR BLD AUTO: 39.8 % (ref 38–50)
HGB BLD-MCNC: 13.9 G/DL (ref 13.2–17.2)
LDH SERPL L TO P-CCNC: 148 U/L (ref 87–246)
LYMPHOCYTES # BLD AUTO: 1.7 10*3/UL
LYMPHOCYTES NFR BLD AUTO: 31 % (ref 15–47)
MCH RBC QN AUTO: 31.2 PG (ref 29–34)
MCHC RBC AUTO-ENTMCNC: 35 G/DL (ref 32–36)
MCV RBC AUTO: 89.2 FL (ref 82–97)
MONOCYTES # BLD AUTO: 0.7 10*3/UL
MONOCYTES NFR BLD AUTO: 13 % (ref 5–13)
NEUTROPHILS # BLD AUTO: 2.6 10*3/UL
NEUTROPHILS NFR BLD AUTO: 49 % (ref 46–70)
PLATELET # BLD AUTO: 210 10*3/UL (ref 130–350)
PMV BLD AUTO: 7.5 FL (ref 6.9–10.8)
POTASSIUM SERPL-SCNC: 4.6 MMOL/L (ref 3.5–5.1)
PROT SERPL-MCNC: 7 G/DL (ref 6–8.3)
RBC # BLD AUTO: 4.46 10*6/ΜL (ref 4.1–5.8)
SODIUM SERPL-SCNC: 136 MMOL/L (ref 135–145)
WBC # BLD AUTO: 5.3 10*3/UL (ref 3.7–9.6)

## 2019-05-28 PROCEDURE — G0463 HOSPITAL OUTPT CLINIC VISIT: HCPCS

## 2019-05-28 PROCEDURE — 85025 COMPLETE CBC W/AUTO DIFF WBC: CPT

## 2019-05-28 PROCEDURE — 80053 COMPREHEN METABOLIC PANEL: CPT

## 2019-05-28 PROCEDURE — 99213 OFFICE O/P EST LOW 20 MIN: CPT | Performed by: INTERNAL MEDICINE

## 2019-05-28 PROCEDURE — 36415 COLL VENOUS BLD VENIPUNCTURE: CPT

## 2019-05-28 PROCEDURE — 83615 LACTATE (LD) (LDH) ENZYME: CPT

## 2019-05-28 ASSESSMENT — PAIN SCALES - GENERAL: PAINLEVEL: 0-NO PAIN

## 2019-05-28 NOTE — PROGRESS NOTES
Medical Oncology Follow-Up    Date of Service: 5/28/2019    Subjective   HISTORY OF PRESENT ILLNESS:  This is a 68-year-old male with non-Hodgkin's lymphoma.  The patient returns to  oncology clinic for followup.     The patient was diagnosed with non-Hodgkin's lymphoma when he underwent surgery for a small-bowel obstruction in February 2017.  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  His PET scan showed FDG avid large mesenteric mass.  His bone marrow biopsy and brain imagings were negative.  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  Our purpose was to eradicate the high-grade component of his lymphoma.  He received 6 cycles of R-CHOP between March and July 2017.  The PET scan done in August 2017 showed complete response.  Since then patient has remained in remission.  Today patient returns oncology clinic for follow-up.    The patient states that he has done well recently without any new health issues.  He has not noticed any unusual symptoms such as enlarging lymph nodes, weight loss, fatigue, fevers, chills or night sweats.  His appetite has been good and has not lost any weight.  He also denies any abdominal symptoms including abdominal pain.          PAST MEDICAL HISTORY:   Past Medical History:   Diagnosis Date   • Diffuse large b-cell lymphoma, extranodal and solid organ sites (CMS/HCC) (HCC)    • Hypertension    • Lymphoma (CMS/HCC) (HCC)    • Personal history of transient ischemic attack         FAMILY HISTORY:   Family History   Problem Relation Age of Onset   • Multiple sclerosis Mother    • Heart disease Mother    • Lung cancer Father    • Prostate cancer Father    • Cancer Father         SOCIAL HISTORY:   Social History     Socioeconomic History   • Marital status:      Spouse name: Not on file   • Number of children: Not on  file   • Years of education: Not on file   • Highest education level: Not on file   Occupational History   • Not on file   Social Needs   • Financial resource strain: Not on file   • Food insecurity:     Worry: Not on file     Inability: Not on file   • Transportation needs:     Medical: Not on file     Non-medical: Not on file   Tobacco Use   • Smoking status: Never Smoker   • Smokeless tobacco: Former User     Types: Chew   • Tobacco comment: Age started: 22: age stopped: 51   Substance and Sexual Activity   • Alcohol use: No   • Drug use: No   • Sexual activity: Not on file   Lifestyle   • Physical activity:     Days per week: Not on file     Minutes per session: Not on file   • Stress: Not on file   Relationships   • Social connections:     Talks on phone: Not on file     Gets together: Not on file     Attends Pentecostal service: Not on file     Active member of club or organization: Not on file     Attends meetings of clubs or organizations: Not on file     Relationship status: Not on file   • Intimate partner violence:     Fear of current or ex partner: Not on file     Emotionally abused: Not on file     Physically abused: Not on file     Forced sexual activity: Not on file   Other Topics Concern   • Not on file   Social History Narrative   • Not on file        ALLERGIES:   Allergies as of 05/28/2019 - Reviewed 05/28/2019   Allergen Reaction Noted   • Shrimp  03/21/2017        MEDICATIONS:   Current Outpatient Medications   Medication Sig Dispense Refill   • SHINGRIX, PF, 50 mcg/0.5 mL suspension for reconstitution vaccine      • fish oil-dha-epa 1,200-144-216 mg capsule Take 1,200 mg by mouth 2 (two) times a day.     • allopurinol (ZYLOPRIM) 300 mg tablet      • amLODIPine (NORVASC) 10 mg tablet      • aspirin 325 mg tablet      • citalopram (CeleXA) 20 mg tablet      • docusate sodium (COLACE) 100 mg capsule      • dicloxacillin (DYNAPEN) 250 mg capsule      • lisinopril (PRINIVIL,ZESTRIL) 20 mg tablet      •  LORazepam (ATIVAN) 0.5 mg tablet      • losartan (COZAAR) 100 mg tablet      • metoprolol succinate XL (TOPROL-XL) 25 mg 24 hr tablet      • prochlorperazine (COMPAZINE) 10 mg tablet        No current facility-administered medications for this visit.        REVIEW OF SYSTEMS:   14 point systems review was done.  It was negative    The ECOG performance status today is 0          Objective    PHYSICAL EXAM:   BP (!) 192/93   Pulse 53   Temp 36.5 °C (97.7 °F) (Temporal)   Wt 75.2 kg (165 lb 12.8 oz)   SpO2 96%   BMI 25.21 kg/m²    Body mass index is 25.21 kg/m².   Patient looked well.  No distress     HEENT: No scleral icterus.  No oral or pharyngeal lesions     Ln: No lymphadenopathy in neck axilla or groin     Lungs: Clear.  No rales rhonchi or wheezing     CVS: Regular rate and rhythm.  No S3.  No friction rub     Abdomen: Benign nontender.  No hepatosplenomegaly.  No palpable abnormalities     Ext: No edema.  Pulses intact     Musculoskeletal: No muscle or bone tenderness including spine pelvis     Skin: No petechia purpura rash     Neuro: Alert oriented ×3.  No focal deficits            Physical Exam    DIAGNOSTIC REPORTS REVIEWED:   CBC, the comprehensive metabolic panel and LDH done today were normal with the exception of slight elevated glucose at 112.           Assessment/Plan    IMPRESSION and PLAN:   This is a 68-year-old male with history of non-Hodgkin's lymphoma.  The patient returns oncology clinic for follow-up.    Clinically patient continues to do well without any new health issues.  He does not have signs and symptoms of recurrence of his lymphoma.  His examination is unremarkable.  His labs are within normal limits.  Therefore clinically he appears to be in remission nearly 2 years after the completion of his chemotherapy.  As a result, we will start seeing him every 6 months for an additional 3 years.  In this timeframe we will obtain imaging studies only on an as-needed basis.    I had a  fairly long conversation with the patient.  I reviewed his labs.  Once again I reviewed the follow-up plan and answered his questions.  Patient verbalized understanding and approval of ongoing management and my recommendations.  As a result, he will return to oncology clinic in 6 months for follow-up.               Physician: M. BEHNAN SAHIN, MD

## 2019-11-27 ENCOUNTER — OFFICE VISIT (OUTPATIENT)
Dept: ONCOLOGY | Facility: CLINIC | Age: 69
End: 2019-11-27
Payer: COMMERCIAL

## 2019-11-27 ENCOUNTER — APPOINTMENT (OUTPATIENT)
Dept: ONCOLOGY | Facility: CLINIC | Age: 69
End: 2019-11-27
Payer: COMMERCIAL

## 2019-11-27 VITALS
OXYGEN SATURATION: 97 % | WEIGHT: 165 LBS | HEART RATE: 60 BPM | SYSTOLIC BLOOD PRESSURE: 173 MMHG | BODY MASS INDEX: 25.09 KG/M2 | TEMPERATURE: 98 F | DIASTOLIC BLOOD PRESSURE: 93 MMHG

## 2019-11-27 DIAGNOSIS — C83.398 DIFFUSE LARGE B-CELL LYMPHOMA OF SOLID ORGAN EXCLUDING SPLEEN: Primary | ICD-10-CM

## 2019-11-27 DIAGNOSIS — C83.398 DIFFUSE LARGE B-CELL LYMPHOMA, EXTRANODAL AND SOLID ORGAN SITES: ICD-10-CM

## 2019-11-27 LAB
ALBUMIN SERPL-MCNC: 4.3 G/DL (ref 3.5–5.3)
ALP SERPL-CCNC: 96 U/L (ref 45–115)
ALT SERPL-CCNC: 13 U/L (ref 0–52)
ANION GAP SERPL CALC-SCNC: 8 MMOL/L (ref 3–11)
AST SERPL-CCNC: 18 U/L (ref 0–39)
BASOPHILS # BLD AUTO: 0.1 10*3/UL
BASOPHILS NFR BLD AUTO: 2 % (ref 0–2)
BILIRUB SERPL-MCNC: 0.93 MG/DL (ref 0–1.4)
BUN SERPL-MCNC: 25 MG/DL (ref 7–25)
CALCIUM ALBUM COR SERPL-MCNC: 9.3 MG/DL (ref 8.6–10.3)
CALCIUM SERPL-MCNC: 9.5 MG/DL (ref 8.6–10.3)
CHLORIDE SERPL-SCNC: 104 MMOL/L (ref 98–107)
CO2 SERPL-SCNC: 24 MMOL/L (ref 21–32)
CREAT SERPL-MCNC: 1.4 MG/DL (ref 0.7–1.3)
EOSINOPHIL # BLD AUTO: 0.2 10*3/UL
EOSINOPHIL NFR BLD AUTO: 4 % (ref 0–3)
ERYTHROCYTE [DISTWIDTH] IN BLOOD BY AUTOMATED COUNT: 12.6 % (ref 11.5–15)
GFR SERPL CREATININE-BSD FRML MDRD: 51 ML/MIN/1.73M*2
GLUCOSE SERPL-MCNC: 100 MG/DL (ref 70–105)
HCT VFR BLD AUTO: 40.7 % (ref 38–50)
HGB BLD-MCNC: 14.3 G/DL (ref 13.2–17.2)
LDH SERPL L TO P-CCNC: 132 U/L (ref 87–246)
LYMPHOCYTES # BLD AUTO: 1.8 10*3/UL
LYMPHOCYTES NFR BLD AUTO: 30 % (ref 15–47)
MCH RBC QN AUTO: 32.7 PG (ref 29–34)
MCHC RBC AUTO-ENTMCNC: 35.2 G/DL (ref 32–36)
MCV RBC AUTO: 92.9 FL (ref 82–97)
MONOCYTES # BLD AUTO: 0.6 10*3/UL
MONOCYTES NFR BLD AUTO: 11 % (ref 5–13)
NEUTROPHILS # BLD AUTO: 3.1 10*3/UL
NEUTROPHILS NFR BLD AUTO: 54 % (ref 46–70)
PLATELET # BLD AUTO: 222 10*3/UL (ref 130–350)
PMV BLD AUTO: 8.1 FL (ref 6.9–10.8)
POTASSIUM SERPL-SCNC: 4.4 MMOL/L (ref 3.5–5.1)
PROT SERPL-MCNC: 6.9 G/DL (ref 6–8.3)
RBC # BLD AUTO: 4.38 10*6/ΜL (ref 4.1–5.8)
SODIUM SERPL-SCNC: 136 MMOL/L (ref 135–145)
WBC # BLD AUTO: 5.8 10*3/UL (ref 3.7–9.6)

## 2019-11-27 PROCEDURE — 36415 COLL VENOUS BLD VENIPUNCTURE: CPT

## 2019-11-27 PROCEDURE — 80053 COMPREHEN METABOLIC PANEL: CPT

## 2019-11-27 PROCEDURE — 85025 COMPLETE CBC W/AUTO DIFF WBC: CPT

## 2019-11-27 PROCEDURE — 99213 OFFICE O/P EST LOW 20 MIN: CPT | Performed by: NURSE PRACTITIONER

## 2019-11-27 PROCEDURE — 83615 LACTATE (LD) (LDH) ENZYME: CPT

## 2019-11-27 RX ORDER — IBUPROFEN 600 MG/1
600 TABLET ORAL EVERY 8 HOURS PRN
COMMUNITY
Start: 2019-11-26 | End: 2022-03-02 | Stop reason: ALTCHOICE

## 2019-11-27 RX ORDER — AMOXICILLIN 875 MG/1
875 TABLET, FILM COATED ORAL 2 TIMES DAILY
COMMUNITY
Start: 2019-11-26 | End: 2020-06-08 | Stop reason: ALTCHOICE

## 2019-11-27 ASSESSMENT — ENCOUNTER SYMPTOMS: BRUISES/BLEEDS EASILY: 1

## 2019-11-27 NOTE — PROGRESS NOTES
Oncology Progress Note      Chief Complaint:    Rivera Juarez is a 69 y.o. male with previously treated stage IE   1. Diffuse large B-cell lymphoma of solid organ excluding spleen (CMS/HCC) (HCC)    , here for routine follow-up surveillance.    History of Present Illness:    The patient was diagnosed with non-Hodgkin's lymphoma when he underwent surgery for a small-bowel obstruction in February 2017.  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  His PET scan showed FDG avid large mesenteric mass.  His bone marrow biopsy and brain imagings were negative.  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  Our purpose was to eradicate the high-grade component of his lymphoma.  He received 6 cycles of R-CHOP between March and July 2017.  The PET scan done in August 2017 showed complete response.      He is here today for routine follow-up surveillance.  He denies any new symptoms over the past several months.  He continues to feel well and remains fully active.  He started having some gum tenderness this week and saw his dental provider yesterday.  He was started on antibiotic for infection and plans to have the tooth pulled next week.    Significant Comorbidity:   Past Medical History:   Diagnosis Date   • Diffuse large b-cell lymphoma, extranodal and solid organ sites (CMS/HCC) (HCC)    • Hypertension    • Lymphoma (CMS/HCC) (HCC)    • Personal history of transient ischemic attack      Past Surgical History:   Procedure Laterality Date   • BOWEL RESECTION  02/21/2017    Small     Social History:  Social History     Socioeconomic History   • Marital status:      Spouse name: Not on file   • Number of children: Not on file   • Years of education: Not on file   • Highest education level: Not on file   Occupational History   • Not on file   Social Needs   •  Financial resource strain: Not on file   • Food insecurity     Worry: Not on file     Inability: Not on file   • Transportation needs     Medical: Not on file     Non-medical: Not on file   Tobacco Use   • Smoking status: Never Smoker   • Smokeless tobacco: Former User     Types: Chew   • Tobacco comment: Age started: 22: age stopped: 51   Substance and Sexual Activity   • Alcohol use: No   • Drug use: No   • Sexual activity: Not on file   Lifestyle   • Physical activity     Days per week: Not on file     Minutes per session: Not on file   • Stress: Not on file   Relationships   • Social connections     Talks on phone: Not on file     Gets together: Not on file     Attends Oriental orthodox service: Not on file     Active member of club or organization: Not on file     Attends meetings of clubs or organizations: Not on file     Relationship status: Not on file   • Intimate partner violence     Fear of current or ex partner: Not on file     Emotionally abused: Not on file     Physically abused: Not on file     Forced sexual activity: Not on file   Other Topics Concern   • Not on file   Social History Narrative   • Not on file     Current Medications:  Current Outpatient Medications:   •  amoxicillin (AMOXIL) 875 mg tablet, Take 875 mg by mouth 2 (two) times a day, Disp: , Rfl:   •  ibuprofen (ADVIL,MOTRIN) 600 mg tablet, Take 600 mg by mouth every 8 (eight) hours as needed, Disp: , Rfl:   •  amLODIPine (NORVASC) 10 mg tablet, , Disp: , Rfl:   •  aspirin 325 mg tablet, , Disp: , Rfl:   •  citalopram (CeleXA) 20 mg tablet, , Disp: , Rfl:   •  fish oil-dha-epa 1,200-144-216 mg capsule, Take 1,200 mg by mouth 2 (two) times a day., Disp: , Rfl:   •  losartan (COZAAR) 100 mg tablet, , Disp: , Rfl:   •  metoprolol succinate XL (TOPROL-XL) 25 mg 24 hr tablet, , Disp: , Rfl:     Allergies:  Allergies   Allergen Reactions   • Shrimp      throat closing     Review of Systems:  Reported ECOG performance status of: 0 -  Asymptomatic  Review of Systems   HENT:          Infected tooth, just saw DDS and on abx   Hematological: Bruises/bleeds easily (bruises easily).   All other systems reviewed and are negative.    PAIN: He denies any pain.    Physical Exam:  /93 (BP Location: Left arm)   Pulse 60   Temp 36.7 °C (98 °F) (Temporal)   Wt 74.8 kg (165 lb)   SpO2 97%   BMI 25.09 kg/m²   Physical Exam  Constitutional:       Comments: Well appearing and in no acute distress.   HENT:      Mouth/Throat:      Mouth: Mucous membranes are moist. No oral lesions.      Pharynx: No posterior oropharyngeal erythema.   Eyes:      Conjunctiva/sclera: Conjunctivae normal.      Comments: Sclera anicteric.   Cardiovascular:      Rate and Rhythm: Normal rate and regular rhythm.      Heart sounds: S1 normal and S2 normal. No murmur. No friction rub. No gallop.    Pulmonary:      Effort: Pulmonary effort is normal.      Breath sounds: Normal breath sounds. No wheezing, rhonchi or rales.   Abdominal:      General: Bowel sounds are normal. There is no distension.      Palpations: Abdomen is soft. There is no hepatomegaly or splenomegaly.      Tenderness: There is no abdominal tenderness.   Musculoskeletal: Normal range of motion.      Right lower leg: No edema.      Left lower leg: No edema.   Lymphadenopathy:      Cervical: No cervical adenopathy.      Upper Body:      Right upper body: No supraclavicular or axillary adenopathy.      Left upper body: No supraclavicular or axillary adenopathy.   Skin:     General: Skin is warm and dry.      Findings: No ecchymosis or rash.      Nails: There is no clubbing.     Neurological:      Mental Status: He is alert and oriented to person, place, and time.      Comments: Exam non-focal.   Psychiatric:         Mood and Affect: Mood normal.         Behavior: Behavior normal.         Lab Studies:  CBC with differential:    Lab Results   Component Value Date    WBC 5.8 11/27/2019    HGB 14.3 11/27/2019    HCT  40.7 11/27/2019     11/27/2019    RBC 4.38 11/27/2019    MCV 92.9 11/27/2019    MCH 32.7 11/27/2019    MCHC 35.2 11/27/2019    RDW 12.6 11/27/2019    MPV 8.1 11/27/2019    NEUTROABS 3.10 11/27/2019     CMP:   Lab Results   Component Value Date     11/27/2019    K 4.4 11/27/2019     11/27/2019    CO2 24 11/27/2019    BUN 25 11/27/2019    CREATININE 1.40 (H) 11/27/2019    GLUCOSE 100 11/27/2019    CALCIUM 9.5 11/27/2019    PROT 6.9 11/27/2019    ALBUMIN 4.3 11/27/2019    AST 18 11/27/2019    ALT 13 11/27/2019    ALKPHOS 96 11/27/2019    BILITOT 0.93 11/27/2019     Lab Results   Component Value Date     11/27/2019     Imaging:  None performed today.    Impression/Plan:  1.  Rivera Juarez is a 69 y.o. male with previously treated diffuse large B-cell lymphoma stage IE: He has no evidence of disease recurrence based on physical exam, review of systems, today's labs.  He will continue to be followed in surveillance.    2.  Mild renal insufficiency: Possibly related to dehydration.  He has had episodes of creatinine up to 1.4 in the past with normalization of creatinine in between.  He was encouraged to continue to maintain obtain hydration with goal of 64 ounces of non-caffeinated fluid daily.  He did go hunting over the weekend and then did not drink as much yesterday with his tooth discomfort and visiting the dentist and could be slightly dehydrated related to this.  3.  Hypertension: He has elevated blood pressures at any provider visits.  He does monitor his blood pressure at home routinely and reports systolic blood pressure of 120s to 130s over 70s to 80s.  No further intervention and he will continue to monitor.  4.  He will return for follow-up in 6 months with CBC, CMP, LDH labs.  He was advised to call with any questions or concerns in the interim.    DEVIN BOOTH CNP

## 2020-06-02 ENCOUNTER — TELEPHONE (OUTPATIENT)
Dept: ONCOLOGY | Facility: CLINIC | Age: 70
End: 2020-06-02

## 2020-06-08 ENCOUNTER — LAB (OUTPATIENT)
Dept: ONCOLOGY | Facility: CLINIC | Age: 70
End: 2020-06-08
Payer: COMMERCIAL

## 2020-06-08 ENCOUNTER — TELEPHONE - BILLABLE (OUTPATIENT)
Dept: ONCOLOGY | Facility: CLINIC | Age: 70
End: 2020-06-08
Payer: COMMERCIAL

## 2020-06-08 VITALS
WEIGHT: 169 LBS | TEMPERATURE: 99 F | DIASTOLIC BLOOD PRESSURE: 80 MMHG | BODY MASS INDEX: 25.7 KG/M2 | HEART RATE: 71 BPM | OXYGEN SATURATION: 98 % | SYSTOLIC BLOOD PRESSURE: 171 MMHG

## 2020-06-08 DIAGNOSIS — C83.398 DIFFUSE LARGE B-CELL LYMPHOMA OF SOLID ORGAN EXCLUDING SPLEEN: ICD-10-CM

## 2020-06-08 LAB
ALBUMIN SERPL-MCNC: 4.8 G/DL (ref 3.5–5.3)
ALP SERPL-CCNC: 91 U/L (ref 45–115)
ALT SERPL-CCNC: 13 U/L (ref 7–52)
ANION GAP SERPL CALC-SCNC: 8 MMOL/L (ref 3–11)
AST SERPL-CCNC: 17 U/L
BASOPHILS # BLD AUTO: 0.1 10*3/UL
BASOPHILS NFR BLD AUTO: 1 % (ref 0–2)
BILIRUB SERPL-MCNC: 1.57 MG/DL (ref 0.2–1.4)
BUN SERPL-MCNC: 17 MG/DL (ref 7–25)
CALCIUM ALBUM COR SERPL-MCNC: 8.8 MG/DL (ref 8.6–10.3)
CALCIUM SERPL-MCNC: 9.4 MG/DL (ref 8.6–10.3)
CHLORIDE SERPL-SCNC: 102 MMOL/L (ref 98–107)
CO2 SERPL-SCNC: 27 MMOL/L (ref 21–32)
CREAT SERPL-MCNC: 1.21 MG/DL (ref 0.7–1.3)
EOSINOPHIL # BLD AUTO: 0.3 10*3/UL
EOSINOPHIL NFR BLD AUTO: 4 % (ref 0–3)
ERYTHROCYTE [DISTWIDTH] IN BLOOD BY AUTOMATED COUNT: 13.2 % (ref 11.5–15)
GFR SERPL CREATININE-BSD FRML MDRD: 61 ML/MIN/1.73M*2
GLUCOSE SERPL-MCNC: 111 MG/DL (ref 70–105)
HCT VFR BLD AUTO: 42.6 % (ref 38–50)
HGB BLD-MCNC: 14.9 G/DL (ref 13.2–17.2)
LDH SERPL L TO P-CCNC: 146 U/L (ref 87–246)
LYMPHOCYTES # BLD AUTO: 2.1 10*3/UL
LYMPHOCYTES NFR BLD AUTO: 30 % (ref 15–47)
MCH RBC QN AUTO: 31 PG (ref 29–34)
MCHC RBC AUTO-ENTMCNC: 34.9 G/DL (ref 32–36)
MCV RBC AUTO: 88.9 FL (ref 82–97)
MONOCYTES # BLD AUTO: 0.8 10*3/UL
MONOCYTES NFR BLD AUTO: 12 % (ref 5–13)
NEUTROPHILS # BLD AUTO: 3.9 10*3/UL
NEUTROPHILS NFR BLD AUTO: 54 % (ref 46–70)
PLATELET # BLD AUTO: 221 10*3/UL (ref 130–350)
PMV BLD AUTO: 7.8 FL (ref 6.9–10.8)
POTASSIUM SERPL-SCNC: 4.3 MMOL/L (ref 3.5–5.1)
PROT SERPL-MCNC: 7.3 G/DL (ref 6–8.3)
RBC # BLD AUTO: 4.8 10*6/ΜL (ref 4.1–5.8)
SODIUM SERPL-SCNC: 137 MMOL/L (ref 135–145)
WBC # BLD AUTO: 7.2 10*3/UL (ref 3.7–9.6)

## 2020-06-08 PROCEDURE — 99212 OFFICE O/P EST SF 10 MIN: CPT | Mod: 95 | Performed by: NURSE PRACTITIONER

## 2020-06-08 PROCEDURE — 85025 COMPLETE CBC W/AUTO DIFF WBC: CPT

## 2020-06-08 PROCEDURE — 80053 COMPREHEN METABOLIC PANEL: CPT

## 2020-06-08 PROCEDURE — 36415 COLL VENOUS BLD VENIPUNCTURE: CPT

## 2020-06-08 PROCEDURE — 83615 LACTATE (LD) (LDH) ENZYME: CPT

## 2020-06-08 ASSESSMENT — ENCOUNTER SYMPTOMS: BRUISES/BLEEDS EASILY: 1

## 2020-06-08 NOTE — PROGRESS NOTES
Per discussion with DEVIN BOOTH CNP, Rivera, has verbally consented to be treated via a telephone based visit: Yes. A total of 5 minutes were required for this telephone based visit.   Patient Location: Home  Provider Location: Clinic  Technology used by Provider: Phone    Oncology Progress Note      Chief Complaint:    Rivrea Juarez is a 69 y.o. male with previously treated stage IE   1. Diffuse large B-cell lymphoma of solid organ excluding spleen (CMS/HCC) (HCC)    , presents for routine follow-up surveillance.    History of Present Illness:    The patient was diagnosed with non-Hodgkin's lymphoma when he underwent surgery for a small-bowel obstruction in February 2017.  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  His PET scan showed FDG avid large mesenteric mass.  His bone marrow biopsy and brain imagings were negative.  As a result, with the diagnosis of stage IE diffuse large B-cell lymphoma involving small bowel, he was started on chemotherapy with R-CHOP regimen.  Purpose was to eradicate the high-grade component of his lymphoma.  He received 6 cycles of R-CHOP between March and July 2017.  The PET scan done in August 2017 showed complete response.      He presents today for routine follow-up surveillance.  He reports doing well over the past several months without any specific symptoms here new medical concerns.  He remains fully active.    Significant Comorbidity:   Past Medical History:   Diagnosis Date   • Diffuse large b-cell lymphoma, extranodal and solid organ sites (CMS/HCC) (HCC)    • Hypertension    • Lymphoma (CMS/HCC) (HCC)    • Personal history of transient ischemic attack      Past Surgical History:   Procedure Laterality Date   • BOWEL RESECTION  02/21/2017    Small     Social History:  Social History     Socioeconomic History   • Marital status:      Spouse name: Not on file   • Number of children: Not on file   • Years  of education: Not on file   • Highest education level: Not on file   Occupational History   • Not on file   Social Needs   • Financial resource strain: Not on file   • Food insecurity     Worry: Not on file     Inability: Not on file   • Transportation needs     Medical: Not on file     Non-medical: Not on file   Tobacco Use   • Smoking status: Never Smoker   • Smokeless tobacco: Former User     Types: Chew   • Tobacco comment: Age started: 22: age stopped: 51   Substance and Sexual Activity   • Alcohol use: No   • Drug use: No   • Sexual activity: Not on file   Lifestyle   • Physical activity     Days per week: Not on file     Minutes per session: Not on file   • Stress: Not on file   Relationships   • Social connections     Talks on phone: Not on file     Gets together: Not on file     Attends Gnosticism service: Not on file     Active member of club or organization: Not on file     Attends meetings of clubs or organizations: Not on file     Relationship status: Not on file   • Intimate partner violence     Fear of current or ex partner: Not on file     Emotionally abused: Not on file     Physically abused: Not on file     Forced sexual activity: Not on file   Other Topics Concern   • Not on file   Social History Narrative   • Not on file     Current Medications:  Current Outpatient Medications:   •  ibuprofen (ADVIL,MOTRIN) 600 mg tablet, Take 600 mg by mouth every 8 (eight) hours as needed, Disp: , Rfl:   •  fish oil-dha-epa 1,200-144-216 mg capsule, Take 1,200 mg by mouth 2 (two) times a day., Disp: , Rfl:   •  amLODIPine (NORVASC) 10 mg tablet, , Disp: , Rfl:   •  aspirin 325 mg tablet, , Disp: , Rfl:   •  citalopram (CeleXA) 20 mg tablet, , Disp: , Rfl:   •  losartan (COZAAR) 100 mg tablet, , Disp: , Rfl:   •  metoprolol succinate XL (TOPROL-XL) 25 mg 24 hr tablet, , Disp: , Rfl:     Allergies:  Allergies   Allergen Reactions   • Shrimp      throat closing     Review of Systems:  Reported ECOG performance  status of: 0 - Asymptomatic  Review of Systems   Hematological: Bruises/bleeds easily (bruises easily).   All other systems reviewed and are negative.    PAIN: He denies any pain.    Physical Exam:  There were no vitals taken for this visit.   Weight is 76.658 kg or 169 pounds.  Blood pressure 171/80, heart rate 71, respiratory rate 16, temperature 99.0, SPO2 98% on room air.    Physical Exam  Physical exam not performed today due to this being a telephone visit in the setting of current COVID-19 contact precautions.    Lab Studies:  CBC with differential:    Lab Results   Component Value Date    WBC 7.2 06/08/2020    HGB 14.9 06/08/2020    HCT 42.6 06/08/2020     06/08/2020    RBC 4.80 06/08/2020    MCV 88.9 06/08/2020    MCH 31.0 06/08/2020    MCHC 34.9 06/08/2020    RDW 13.2 06/08/2020    MPV 7.8 06/08/2020    NEUTROABS 3.90 06/08/2020     CMP:   Lab Results   Component Value Date     06/08/2020    K 4.3 06/08/2020     06/08/2020    CO2 27 06/08/2020    BUN 17 06/08/2020    CREATININE 1.21 06/08/2020    GLUCOSE 111 (H) 06/08/2020    CALCIUM 9.4 06/08/2020    PROT 7.3 06/08/2020    ALBUMIN 4.8 06/08/2020    AST 17 06/08/2020    ALT 13 06/08/2020    ALKPHOS 91 06/08/2020    BILITOT 1.57 (H) 06/08/2020     Lab Results   Component Value Date     06/08/2020     Imaging:  None performed today.    Impression/Plan:  1.  Rivera Juarez is a 69 y.o. male with previously treated diffuse large B-cell lymphoma stage IE: He is doing well subjectively without specific symptoms suggestive of disease recurrence or progression.  He will continue to be followed in surveillance.    2.  Hypertension: He has elevated blood pressures at any provider visits.  He does monitor his blood pressure at home routinely and reports systolic blood pressure of 120s to 130s over 70s to 80s.  No further intervention and he will continue to monitor.  4.  He will return for follow-up in 6 months with CBC, CMP, LDH labs.  He was  advised to call with any questions or concerns in the interim.    DEVIN BOOTH CNP    I spent 5 minutes with the patient today with greater than 50% in counseling and/or coordination of care in regards to low-grade lymphoma/diffuse large B-cell lymphoma follow-up.

## 2020-11-24 ENCOUNTER — TELEPHONE (OUTPATIENT)
Dept: ONCOLOGY | Facility: CLINIC | Age: 70
End: 2020-11-24

## 2021-01-20 ENCOUNTER — TELEPHONE (OUTPATIENT)
Dept: ONCOLOGY | Facility: CLINIC | Age: 71
End: 2021-01-20

## 2021-03-08 ENCOUNTER — OFFICE VISIT (OUTPATIENT)
Dept: ONCOLOGY | Facility: CLINIC | Age: 71
End: 2021-03-08
Payer: COMMERCIAL

## 2021-03-08 ENCOUNTER — APPOINTMENT (OUTPATIENT)
Dept: ONCOLOGY | Facility: CLINIC | Age: 71
End: 2021-03-08
Payer: COMMERCIAL

## 2021-03-08 VITALS
OXYGEN SATURATION: 100 % | SYSTOLIC BLOOD PRESSURE: 182 MMHG | BODY MASS INDEX: 26.3 KG/M2 | WEIGHT: 173 LBS | DIASTOLIC BLOOD PRESSURE: 103 MMHG | TEMPERATURE: 98.3 F | HEART RATE: 70 BPM

## 2021-03-08 DIAGNOSIS — D64.9 ANEMIA, UNSPECIFIED TYPE: ICD-10-CM

## 2021-03-08 DIAGNOSIS — C83.398 DIFFUSE LARGE B-CELL LYMPHOMA OF SOLID ORGAN EXCLUDING SPLEEN: Primary | ICD-10-CM

## 2021-03-08 DIAGNOSIS — C83.398 DIFFUSE LARGE B-CELL LYMPHOMA OF SOLID ORGAN EXCLUDING SPLEEN: ICD-10-CM

## 2021-03-08 LAB
ALBUMIN SERPL-MCNC: 4.2 G/DL (ref 3.5–5.3)
ALP SERPL-CCNC: 81 U/L (ref 45–115)
ALT SERPL-CCNC: 10 U/L (ref 7–52)
ANION GAP SERPL CALC-SCNC: 6 MMOL/L (ref 3–11)
AST SERPL-CCNC: 14 U/L
BASOPHILS # BLD AUTO: 0.1 10*3/UL
BASOPHILS NFR BLD AUTO: 2 % (ref 0–2)
BILIRUB SERPL-MCNC: 0.78 MG/DL (ref 0.2–1.4)
BUN SERPL-MCNC: 18 MG/DL (ref 7–25)
CALCIUM ALBUM COR SERPL-MCNC: 8.8 MG/DL (ref 8.6–10.3)
CALCIUM SERPL-MCNC: 9 MG/DL (ref 8.6–10.3)
CHLORIDE SERPL-SCNC: 105 MMOL/L (ref 98–107)
CO2 SERPL-SCNC: 28 MMOL/L (ref 21–32)
CREAT SERPL-MCNC: 1.07 MG/DL (ref 0.7–1.3)
EOSINOPHIL # BLD AUTO: 0.2 10*3/UL
EOSINOPHIL NFR BLD AUTO: 4 % (ref 0–3)
ERYTHROCYTE [DISTWIDTH] IN BLOOD BY AUTOMATED COUNT: 12.8 % (ref 11.5–15)
FERRITIN SERPL-MCNC: 155.4 NG/ML (ref 24–336)
GFR SERPL CREATININE-BSD FRML MDRD: 70 ML/MIN/1.73M*2
GLUCOSE SERPL-MCNC: 89 MG/DL (ref 70–105)
HCT VFR BLD AUTO: 36.9 % (ref 38–50)
HGB BLD-MCNC: 13 G/DL (ref 13.2–17.2)
IRON SATN MFR SERPL: 21 % (ref 13–50)
IRON SERPL-MCNC: 62 UG/DL (ref 50–175)
LDH SERPL L TO P-CCNC: 121 U/L (ref 87–246)
LYMPHOCYTES # BLD AUTO: 2.3 10*3/UL
LYMPHOCYTES NFR BLD AUTO: 33 % (ref 15–47)
MCH RBC QN AUTO: 31.5 PG (ref 29–34)
MCHC RBC AUTO-ENTMCNC: 35.3 G/DL (ref 32–36)
MCV RBC AUTO: 89.3 FL (ref 82–97)
MONOCYTES # BLD AUTO: 0.9 10*3/UL
MONOCYTES NFR BLD AUTO: 13 % (ref 5–13)
NEUTROPHILS # BLD AUTO: 3.4 10*3/UL
NEUTROPHILS NFR BLD AUTO: 49 % (ref 46–70)
PLATELET # BLD AUTO: 193 10*3/UL (ref 130–350)
PMV BLD AUTO: 8.2 FL (ref 6.9–10.8)
POTASSIUM SERPL-SCNC: 4.1 MMOL/L (ref 3.5–5.1)
PROT SERPL-MCNC: 6 G/DL (ref 6–8.3)
RBC # BLD AUTO: 4.14 10*6/ΜL (ref 4.1–5.8)
SODIUM SERPL-SCNC: 139 MMOL/L (ref 135–145)
TIBC SERPL-MCNC: 296 UG/DL (ref 250–450)
UIBC SERPL-MCNC: 234 UG/DL (ref 155–355)
VIT B12 SERPL-MCNC: 733 PG/ML (ref 180–914)
WBC # BLD AUTO: 6.9 10*3/UL (ref 3.7–9.6)

## 2021-03-08 PROCEDURE — 82728 ASSAY OF FERRITIN: CPT

## 2021-03-08 PROCEDURE — 83615 LACTATE (LD) (LDH) ENZYME: CPT

## 2021-03-08 PROCEDURE — 36415 COLL VENOUS BLD VENIPUNCTURE: CPT

## 2021-03-08 PROCEDURE — 83540 ASSAY OF IRON: CPT

## 2021-03-08 PROCEDURE — 99213 OFFICE O/P EST LOW 20 MIN: CPT | Performed by: NURSE PRACTITIONER

## 2021-03-08 PROCEDURE — 85025 COMPLETE CBC W/AUTO DIFF WBC: CPT

## 2021-03-08 PROCEDURE — 82607 VITAMIN B-12: CPT

## 2021-03-08 PROCEDURE — 80053 COMPREHEN METABOLIC PANEL: CPT

## 2021-03-08 ASSESSMENT — ENCOUNTER SYMPTOMS: BRUISES/BLEEDS EASILY: 1

## 2021-03-08 NOTE — PROGRESS NOTES
Oncology Progress Note      Chief Complaint:    Rivera Juarez is a 70 y.o. 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    History of Present Illness:    The patient was diagnosed with non-Hodgkin's lymphoma when he underwent surgery for a small-bowel obstruction in February 2017.  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  His PET scan showed FDG avid large mesenteric mass.  His bone marrow biopsy and brain imagings were negative.  As a result, with the diagnosis of stage IE diffuse large B-cell lymphoma involving small bowel, he was recommended to undergo chemotherapy with R-CHOP.  Our purpose was to eradicate the high-grade component of his lymphoma.  He received 6 cycles of R-CHOP between March and July 2017.  The PET scan done in August 2017 showed complete response.      He is here today for routine follow-up surveillance.  He denies any new concerns over the past several months.  No infections over the winter.  He has had no abdominal symptoms.  No evidence of bleeding or increased bruising.  He has overall felt well.    Significant Comorbidity:   Past Medical History:   Diagnosis Date   • Diffuse large b-cell lymphoma, extranodal and solid organ sites (CMS/HCC) (HCC)    • Hypertension    • Lymphoma (CMS/HCC) (HCC)    • Personal history of transient ischemic attack      Past Surgical History:   Procedure Laterality Date   • BOWEL RESECTION  02/21/2017    Small     Social History:  Social History     Socioeconomic History   • Marital status:      Spouse name: Not on file   • Number of children: Not on file   • Years of education: Not on file   • Highest education level: Not on file   Occupational History   • Not on file   Tobacco Use   • Smoking status: Never Smoker   • Smokeless tobacco: Former User     Types: Chew   • Tobacco comment: Age started: 22:  age stopped: 51   Substance and Sexual Activity   • Alcohol use: No   • Drug use: No   • Sexual activity: Not on file   Other Topics Concern   • Not on file   Social History Narrative   • Not on file     Social Determinants of Health     Financial Resource Strain:    • Difficulty of Paying Living Expenses:    Food Insecurity:    • Worried About Running Out of Food in the Last Year:    • Ran Out of Food in the Last Year:    Transportation Needs:    • Lack of Transportation (Medical):    • Lack of Transportation (Non-Medical):    Physical Activity:    • Days of Exercise per Week:    • Minutes of Exercise per Session:    Stress:    • Feeling of Stress :    Social Connections:    • Frequency of Communication with Friends and Family:    • Frequency of Social Gatherings with Friends and Family:    • Attends Anglican Services:    • Active Member of Clubs or Organizations:    • Attends Club or Organization Meetings:    • Marital Status:    Intimate Partner Violence:    • Fear of Current or Ex-Partner:    • Emotionally Abused:    • Physically Abused:    • Sexually Abused:      Current Medications:  Current Outpatient Medications:   •  amLODIPine (NORVASC) 10 mg tablet, , Disp: , Rfl:   •  aspirin 325 mg tablet, , Disp: , Rfl:   •  citalopram (CeleXA) 20 mg tablet, , Disp: , Rfl:   •  losartan (COZAAR) 100 mg tablet, , Disp: , Rfl:   •  metoprolol succinate XL (TOPROL-XL) 25 mg 24 hr tablet, , Disp: , Rfl:   •  ibuprofen (ADVIL,MOTRIN) 600 mg tablet, Take 600 mg by mouth every 8 (eight) hours as needed, Disp: , Rfl:     Allergies:  Allergies   Allergen Reactions   • Shrimp      throat closing     Review of Systems:  Reported ECOG performance status of: 0 - Asymptomatic  Review of Systems   Hematological: Bruises/bleeds easily (bruises easily).   All other systems reviewed and are negative.    PAIN: He denies any pain.    Physical Exam:  BP (!) 182/103   Pulse 70   Temp 36.8 °C (98.3 °F) (Oral)   Wt 78.5 kg (173 lb)    SpO2 100%   BMI 26.30 kg/m²   Physical Exam  Constitutional:       Comments: Well appearing and in no acute distress.   Eyes:      Conjunctiva/sclera: Conjunctivae normal.      Comments: Sclera anicteric.   Cardiovascular:      Rate and Rhythm: Normal rate and regular rhythm.      Heart sounds: S1 normal and S2 normal. No murmur. No friction rub. No gallop.    Pulmonary:      Effort: Pulmonary effort is normal.      Breath sounds: Normal breath sounds. No wheezing, rhonchi or rales.   Abdominal:      General: Bowel sounds are normal. There is no distension.      Palpations: Abdomen is soft. There is no hepatomegaly or splenomegaly.      Tenderness: There is no abdominal tenderness.   Musculoskeletal:         General: Normal range of motion.      Right lower leg: No edema.      Left lower leg: No edema.   Lymphadenopathy:      Cervical: No cervical adenopathy.      Upper Body:      Right upper body: No supraclavicular or axillary adenopathy.      Left upper body: No supraclavicular or axillary adenopathy.   Skin:     General: Skin is warm and dry.      Findings: No ecchymosis or rash.      Nails: There is no clubbing.   Neurological:      Mental Status: He is alert and oriented to person, place, and time.      Comments: Exam non-focal.   Psychiatric:         Mood and Affect: Mood normal.         Behavior: Behavior normal.         Lab Studies:  CBC with differential:    Lab Results   Component Value Date    WBC 6.9 03/08/2021    HGB 13.0 (L) 03/08/2021    HCT 36.9 (L) 03/08/2021     03/08/2021    RBC 4.14 03/08/2021    MCV 89.3 03/08/2021    MCH 31.5 03/08/2021    MCHC 35.3 03/08/2021    RDW 12.8 03/08/2021    MPV 8.2 03/08/2021    NEUTROABS 3.40 03/08/2021     CMP:   Lab Results   Component Value Date     03/08/2021    K 4.1 03/08/2021     03/08/2021    CO2 28 03/08/2021    BUN 18 03/08/2021    CREATININE 1.07 03/08/2021    GLUCOSE 89 03/08/2021    CALCIUM 9.0 03/08/2021    PROT 6.0 03/08/2021     ALBUMIN 4.2 03/08/2021    AST 14 03/08/2021    ALT 10 03/08/2021    ALKPHOS 81 03/08/2021    BILITOT 0.78 03/08/2021     Lab Results   Component Value Date     03/08/2021     Imaging:  None performed today.    Impression/Plan:  1.  Rivera Juarez is a 70 y.o. male with previously treated diffuse large B-cell lymphoma stage IE: He has no specific evidence of disease recurrence based on physical exam, review of systems, today's labs.    He will continue to be followed in surveillance.    2.  Mild anemia: Hemoglobin slightly decreased today.  He denies any recent bleeding issues.  No dark stool or melena.  Will further evaluate with iron panel and vitamin B12 level.  Otherwise likely plan for CBC recheck for close monitoring in 2 to 3 weeks.  If he has any evidence of iron deficiency will refer for upper endoscopy/colonoscopy.  3.  Hypertension: Elevated blood pressure with visits is a chronic issue for the patient.  He does monitor his blood pressure at home twice daily and reports systolic reading of 120 this morning.  No concerning readings at home.  He will continue to monitor and follow-up with primary care.  4.  He will otherwise return for follow-up in 6 months with CBC, CMP, LDH labs.  He was advised to call with any questions or concerns in the interim.    Martha Almeida CNP    Addendum: Iron panel came back normal.  Vitamin B12 level normal.  We will plan to repeat CBC lab in 3 weeks and closely monitor.  Plan reviewed with Dr. Girard.

## 2021-03-22 ENCOUNTER — CLINICAL SUPPORT (OUTPATIENT)
Dept: FAMILY MEDICINE | Facility: CLINIC | Age: 71
End: 2021-03-22

## 2021-03-22 DIAGNOSIS — Z23 ENCOUNTER FOR VACCINATION: Primary | ICD-10-CM

## 2021-03-29 ENCOUNTER — TELEPHONE (OUTPATIENT)
Dept: ONCOLOGY | Facility: CLINIC | Age: 71
End: 2021-03-29
Payer: COMMERCIAL

## 2021-03-29 ENCOUNTER — APPOINTMENT (OUTPATIENT)
Dept: ONCOLOGY | Facility: CLINIC | Age: 71
End: 2021-03-29
Payer: COMMERCIAL

## 2021-03-29 DIAGNOSIS — C83.398 DIFFUSE LARGE B-CELL LYMPHOMA OF SOLID ORGAN EXCLUDING SPLEEN: ICD-10-CM

## 2021-03-29 DIAGNOSIS — D64.9 ANEMIA, UNSPECIFIED TYPE: ICD-10-CM

## 2021-03-29 LAB
BASOPHILS # BLD AUTO: 0.1 10*3/UL
BASOPHILS NFR BLD AUTO: 1 % (ref 0–2)
EOSINOPHIL # BLD AUTO: 0.3 10*3/UL
EOSINOPHIL NFR BLD AUTO: 4 % (ref 0–3)
ERYTHROCYTE [DISTWIDTH] IN BLOOD BY AUTOMATED COUNT: 13.2 % (ref 11.5–15)
HCT VFR BLD AUTO: 39.3 % (ref 38–50)
HGB BLD-MCNC: 13.7 G/DL (ref 13.2–17.2)
LYMPHOCYTES # BLD AUTO: 2.2 10*3/UL
LYMPHOCYTES NFR BLD AUTO: 33 % (ref 15–47)
MCH RBC QN AUTO: 31.4 PG (ref 29–34)
MCHC RBC AUTO-ENTMCNC: 34.9 G/DL (ref 32–36)
MCV RBC AUTO: 90 FL (ref 82–97)
MONOCYTES # BLD AUTO: 0.9 10*3/UL
MONOCYTES NFR BLD AUTO: 14 % (ref 5–13)
NEUTROPHILS # BLD AUTO: 3.1 10*3/UL
NEUTROPHILS NFR BLD AUTO: 47 % (ref 46–70)
PLATELET # BLD AUTO: 209 10*3/UL (ref 130–350)
PMV BLD AUTO: 8.3 FL (ref 6.9–10.8)
RBC # BLD AUTO: 4.37 10*6/ΜL (ref 4.1–5.8)
WBC # BLD AUTO: 6.5 10*3/UL (ref 3.7–9.6)

## 2021-03-29 PROCEDURE — 85025 COMPLETE CBC W/AUTO DIFF WBC: CPT

## 2021-03-29 PROCEDURE — 36415 COLL VENOUS BLD VENIPUNCTURE: CPT

## 2021-03-29 NOTE — PROGRESS NOTES
CBC reviewed.  Hemoglobin improved back to normal.  Follow up with repeat labs again in September as ordered.

## 2021-11-03 ENCOUNTER — OFFICE VISIT (OUTPATIENT)
Dept: ONCOLOGY | Facility: CLINIC | Age: 71
End: 2021-11-03
Payer: COMMERCIAL

## 2021-11-03 ENCOUNTER — APPOINTMENT (OUTPATIENT)
Dept: ONCOLOGY | Facility: CLINIC | Age: 71
End: 2021-11-03
Payer: COMMERCIAL

## 2021-11-03 VITALS
DIASTOLIC BLOOD PRESSURE: 76 MMHG | HEART RATE: 68 BPM | SYSTOLIC BLOOD PRESSURE: 132 MMHG | WEIGHT: 174.8 LBS | OXYGEN SATURATION: 97 % | TEMPERATURE: 98.9 F | BODY MASS INDEX: 26.58 KG/M2

## 2021-11-03 DIAGNOSIS — D64.9 ANEMIA, UNSPECIFIED TYPE: ICD-10-CM

## 2021-11-03 DIAGNOSIS — D64.9 ANEMIA, UNSPECIFIED TYPE: Primary | ICD-10-CM

## 2021-11-03 DIAGNOSIS — C83.398 DIFFUSE LARGE B-CELL LYMPHOMA OF SOLID ORGAN EXCLUDING SPLEEN: ICD-10-CM

## 2021-11-03 LAB
ALBUMIN SERPL-MCNC: 4.2 G/DL (ref 3.5–5.3)
ALP SERPL-CCNC: 79 U/L (ref 45–115)
ALT SERPL-CCNC: 14 U/L (ref 7–52)
ANION GAP SERPL CALC-SCNC: 8 MMOL/L (ref 3–11)
AST SERPL-CCNC: 18 U/L
BASOPHILS # BLD AUTO: 0.1 10*3/UL
BASOPHILS NFR BLD AUTO: 1 % (ref 0–2)
BILIRUB SERPL-MCNC: 0.82 MG/DL (ref 0.2–1.4)
BUN SERPL-MCNC: 23 MG/DL (ref 7–25)
CALCIUM ALBUM COR SERPL-MCNC: 8.4 MG/DL (ref 8.6–10.3)
CALCIUM SERPL-MCNC: 8.6 MG/DL (ref 8.6–10.3)
CHLORIDE SERPL-SCNC: 102 MMOL/L (ref 98–107)
CO2 SERPL-SCNC: 25 MMOL/L (ref 21–32)
CREAT SERPL-MCNC: 1.31 MG/DL (ref 0.7–1.3)
EOSINOPHIL # BLD AUTO: 0.2 10*3/UL
EOSINOPHIL NFR BLD AUTO: 3 % (ref 0–3)
ERYTHROCYTE [DISTWIDTH] IN BLOOD BY AUTOMATED COUNT: 13.6 % (ref 11.5–15)
FERRITIN SERPL-MCNC: 39.2 NG/ML (ref 24–336)
GFR SERPL CREATININE-BSD FRML MDRD: 55 ML/MIN/1.73M*2
GLUCOSE SERPL-MCNC: 163 MG/DL (ref 70–105)
HCT VFR BLD AUTO: 35 % (ref 38–50)
HGB BLD-MCNC: 12 G/DL (ref 13.2–17.2)
IRON SATN MFR SERPL: 15 % (ref 13–50)
IRON SERPL-MCNC: 55 UG/DL (ref 50–175)
LDH SERPL L TO P-CCNC: 127 U/L (ref 87–246)
LYMPHOCYTES # BLD AUTO: 2.3 10*3/UL
LYMPHOCYTES NFR BLD AUTO: 32 % (ref 15–47)
MCH RBC QN AUTO: 30.8 PG (ref 29–34)
MCHC RBC AUTO-ENTMCNC: 34.4 G/DL (ref 32–36)
MCV RBC AUTO: 89.6 FL (ref 82–97)
MONOCYTES # BLD AUTO: 0.7 10*3/UL
MONOCYTES NFR BLD AUTO: 10 % (ref 5–13)
NEUTROPHILS # BLD AUTO: 3.8 10*3/UL
NEUTROPHILS NFR BLD AUTO: 54 % (ref 46–70)
PLATELET # BLD AUTO: 214 10*3/UL (ref 130–350)
PMV BLD AUTO: 8.1 FL (ref 6.9–10.8)
POTASSIUM SERPL-SCNC: 3.7 MMOL/L (ref 3.5–5.1)
PROT SERPL-MCNC: 6.6 G/DL (ref 6–8.3)
RBC # BLD AUTO: 3.91 10*6/ΜL (ref 4.1–5.8)
SODIUM SERPL-SCNC: 135 MMOL/L (ref 135–145)
TIBC SERPL-MCNC: 360 UG/DL (ref 250–450)
UIBC SERPL-MCNC: 305 UG/DL (ref 155–355)
WBC # BLD AUTO: 7.1 10*3/UL (ref 3.7–9.6)

## 2021-11-03 PROCEDURE — 36415 COLL VENOUS BLD VENIPUNCTURE: CPT

## 2021-11-03 PROCEDURE — 82728 ASSAY OF FERRITIN: CPT

## 2021-11-03 PROCEDURE — 85025 COMPLETE CBC W/AUTO DIFF WBC: CPT

## 2021-11-03 PROCEDURE — 83540 ASSAY OF IRON: CPT

## 2021-11-03 PROCEDURE — 99213 OFFICE O/P EST LOW 20 MIN: CPT | Performed by: NURSE PRACTITIONER

## 2021-11-03 PROCEDURE — 80053 COMPREHEN METABOLIC PANEL: CPT

## 2021-11-03 PROCEDURE — 83615 LACTATE (LD) (LDH) ENZYME: CPT

## 2021-11-03 RX ORDER — LANOLIN ALCOHOL/MO/W.PET/CERES
1000 CREAM (GRAM) TOPICAL DAILY
COMMUNITY

## 2021-11-03 RX ORDER — OMEGA-3 FATTY ACIDS/FISH OIL 340-1000MG
1 CAPSULE ORAL DAILY
COMMUNITY

## 2021-11-03 RX ORDER — ATORVASTATIN CALCIUM 10 MG/1
10 TABLET, FILM COATED ORAL EVERY EVENING
COMMUNITY
Start: 2021-09-23 | End: 2023-05-17 | Stop reason: SINTOL

## 2021-11-03 RX ORDER — LOSARTAN POTASSIUM AND HYDROCHLOROTHIAZIDE 12.5; 1 MG/1; MG/1
1 TABLET ORAL DAILY
COMMUNITY
Start: 2021-09-17

## 2021-11-03 NOTE — PROGRESS NOTES
Oncology Progress Note      Chief Complaint:    Rivera Juarez is a 71 y.o. 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    History of Present Illness:    The patient was diagnosed with non-Hodgkin's lymphoma when he underwent surgery for a small-bowel obstruction in February 2017.  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  His PET scan showed FDG avid large mesenteric mass.  His bone marrow biopsy and brain imagings were negative.  As a result, with the diagnosis of stage IE diffuse large B-cell lymphoma involving small bowel, he was recommended to undergo chemotherapy with R-CHOP.  Our purpose was to eradicate the high-grade component of his lymphoma.  He received 6 cycles of R-CHOP between March and July 2017.  The PET scan done in August 2017 showed complete response.      He is here today for routine follow-up surveillance.  He has felt well the last few months without new concerns.  He denies any bleeding or melena. No pain issues.  Energy has been normal.    Significant Comorbidity:   Past Medical History:   Diagnosis Date   • Diffuse large b-cell lymphoma, extranodal and solid organ sites (CMS/HCC) (HCC)    • Hypertension    • Lymphoma (CMS/HCC) (HCC)    • Personal history of transient ischemic attack      Past Surgical History:   Procedure Laterality Date   • BOWEL RESECTION  02/21/2017    Small     Social History:  Social History     Socioeconomic History   • Marital status:      Spouse name: Not on file   • Number of children: Not on file   • Years of education: Not on file   • Highest education level: Not on file   Occupational History   • Not on file   Tobacco Use   • Smoking status: Never Smoker   • Smokeless tobacco: Former User     Types: Chew   • Tobacco comment: Age started: 22: age stopped: 51   Substance and Sexual Activity   • Alcohol use:  No   • Drug use: No   • Sexual activity: Not on file   Other Topics Concern   • Not on file   Social History Narrative   • Not on file     Social Determinants of Health     Financial Resource Strain: Not on file   Food Insecurity: Not on file   Transportation Needs: Not on file   Physical Activity: Not on file   Stress: Not on file   Social Connections: Not on file   Intimate Partner Violence: Not on file     Current Medications:  Current Outpatient Medications:   •  atorvastatin (LIPITOR) 10 mg tablet, Take 10 mg by mouth daily, Disp: , Rfl:   •  losartan-hydrochlorothiazide (HYZAAR) 100-12.5 mg per tablet, Take 1 tablet by mouth daily, Disp: , Rfl:   •  omega-3 fatty acids-fish oil (Fish OiL) 340-1,000 mg capsule, Take by mouth daily, Disp: , Rfl:   •  cyanocobalamin (vitamin B-12) 1,000 mcg tablet, Take 1,000 mcg by mouth daily, Disp: , Rfl:   •  amLODIPine (NORVASC) 10 mg tablet, , Disp: , Rfl:   •  aspirin 325 mg tablet, , Disp: , Rfl:   •  citalopram (CeleXA) 20 mg tablet, , Disp: , Rfl:   •  metoprolol succinate XL (TOPROL-XL) 25 mg 24 hr tablet, , Disp: , Rfl:   •  ibuprofen (ADVIL,MOTRIN) 600 mg tablet, Take 600 mg by mouth every 8 (eight) hours as needed, Disp: , Rfl:     Allergies:  Allergies   Allergen Reactions   • Shrimp      throat closing     Review of Systems:  Reported ECOG performance status of: 0 - Asymptomatic  Review of Systems   All other systems reviewed and are negative.  Review of systems negative for greater than 10 systems in total.    PAIN: He denies any pain.    Physical Exam:  /76 Comment: manual  Pulse 68   Temp 37.2 °C (98.9 °F) (Temporal)   Wt 79.3 kg (174 lb 12.8 oz)   SpO2 97%   BMI 26.58 kg/m²   Physical Exam  Constitutional:       Comments: Well appearing and in no acute distress.   Eyes:      Conjunctiva/sclera: Conjunctivae normal.      Comments: Sclera anicteric.   Cardiovascular:      Rate and Rhythm: Normal rate and regular rhythm.      Heart sounds: S1 normal  and S2 normal. No murmur heard.   No friction rub. No gallop.    Pulmonary:      Effort: Pulmonary effort is normal.      Breath sounds: Normal breath sounds. No wheezing, rhonchi or rales.   Abdominal:      General: Bowel sounds are normal. There is no distension.      Palpations: Abdomen is soft. There is no hepatomegaly or splenomegaly.      Tenderness: There is no abdominal tenderness.   Musculoskeletal:         General: Normal range of motion.      Right lower leg: No edema.      Left lower leg: No edema.   Lymphadenopathy:      Cervical: No cervical adenopathy.      Upper Body:      Right upper body: No supraclavicular or axillary adenopathy.      Left upper body: No supraclavicular or axillary adenopathy.   Skin:     General: Skin is warm and dry.      Findings: No ecchymosis or rash.      Nails: There is no clubbing.   Neurological:      Mental Status: He is alert and oriented to person, place, and time.      Comments: Exam non-focal.   Psychiatric:         Mood and Affect: Mood normal.         Behavior: Behavior normal.         Lab Studies:  CBC with differential:    Lab Results   Component Value Date    WBC 7.1 11/03/2021    HGB 12.0 (L) 11/03/2021    HCT 35.0 (L) 11/03/2021     11/03/2021    RBC 3.91 (L) 11/03/2021    MCV 89.6 11/03/2021    MCH 30.8 11/03/2021    MCHC 34.4 11/03/2021    RDW 13.6 11/03/2021    MPV 8.1 11/03/2021    NEUTROABS 3.80 11/03/2021     CMP:   Lab Results   Component Value Date     11/03/2021    K 3.7 11/03/2021     11/03/2021    CO2 25 11/03/2021    BUN 23 11/03/2021    CREATININE 1.31 (H) 11/03/2021    GLUCOSE 163 (H) 11/03/2021    CALCIUM 8.6 11/03/2021    PROT 6.6 11/03/2021    ALBUMIN 4.2 11/03/2021    AST 18 11/03/2021    ALT 14 11/03/2021    ALKPHOS 79 11/03/2021    BILITOT 0.82 11/03/2021     Lab Results   Component Value Date     11/03/2021     Imaging:  None performed today.    Impression/Plan:  1.  Rivera Juarez is a 71 y.o. male with previously  treated diffuse large B-cell lymphoma stage IE:   He has no evidence of disease recurrence based on physical exam or review of systems.  Hemoglobin today is slightly low.  Will monitor this closely.  He will continue to be followed in surveillance.   2.  Mild anemia:  Etiology uncertain.  Repeated iron panel today.  Left message on patient's voicemail with information on ferritin being a little bit lower. He was recommended to start ferrous sulfate 325 mg every Monday Wednesday Friday and 2 take this with vitamin C supplement or vitamin C containing juice.  We discussed repeat CBC lab in 1 month but he will be out of town the next couple months.  Will repeat CBC in mid January.  3.  Mild renal insufficiency:  We discussed maintaining adequate hydration.   4.  He will otherwise return for follow-up in 6 months with CBC, CMP, LDH labs.  He was advised to call with any questions or concerns in the interim.    Martha Almeida, CNP

## 2021-11-29 ENCOUNTER — APPOINTMENT (OUTPATIENT)
Age: 71
Setting detail: DERMATOLOGY
End: 2021-11-30

## 2021-11-29 PROBLEM — D00.01 CARCINOMA IN SITU OF LABIAL MUCOSA AND VERMILION BORDER: Status: ACTIVE | Noted: 2021-11-29

## 2021-11-29 PROCEDURE — OTHER MIPS QUALITY: OTHER

## 2021-11-29 PROCEDURE — OTHER CONSULTATION FOR MOHS SURGERY: OTHER

## 2021-11-29 PROCEDURE — OTHER COUNSELING: OTHER

## 2021-11-29 PROCEDURE — OTHER PRESCRIPTION: OTHER

## 2021-11-29 PROCEDURE — OTHER OTHER: OTHER

## 2021-11-29 PROCEDURE — 99204 OFFICE O/P NEW MOD 45 MIN: CPT

## 2021-11-29 RX ORDER — FLUOROURACIL 5 MG/G
CREAM TOPICAL
Qty: 1 | Refills: 0 | Status: CANCELLED | COMMUNITY
Start: 2021-11-29

## 2021-11-29 NOTE — PROCEDURE: OTHER
Other (Free Text): Pt to be treated with 5- Fluorouracil  bid x 4 weeks. Pt to follow up in [place] for a re-evaluation.

## 2021-11-29 NOTE — PROCEDURE: OTHER
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  we discussed that this could end up with a large mohs defect and large reconstruction.  \\n\\nwe discussed options of mohs + reconstruction vs trial of 5FU x 4 weeks to entire lower lip.  risk so 5FU - irritation, pain etc discussed, pt verbalized understanding.\\n\\ncalled pt's derm RN in [place], [place] - eric ott RN.  i discussed these options with her and she was amenable to checking the site clinically in 3 months.  if it is not resolved, they will manage it in [place] (mohs may be indicated at that time). \\n\\npt verbalized understanding about risks and benefits and opted to go forward with 5FU today.\\n\\npt states he will most likely be in [place] in 3 months.  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  we discussed that this could end up with a large mohs defect and large reconstruction.  \\n\\nwe discussed options of mohs + reconstruction vs trial of 5FU x 4 weeks to entire lower lip.  risk so 5FU - irritation, pain etc discussed, pt verbalized understanding.\\n\\ncalled pt's derm RN in [place], [place] - eric ott RN.  i discussed these options with her and she was amenable to checking the site clinically in 3 months.  if it is not resolved, they will manage it in [place] (mohs may be indicated at that time). \\n\\npt verbalized understanding about risks and benefits and opted to go forward with 5FU today.\\n\\npt states he will most likely be in [place] in 3 months.  if not he will f/u in our clinic to recheck lip

## 2021-11-30 ENCOUNTER — RX ONLY (RX ONLY)
Age: 71
End: 2021-11-30

## 2021-11-30 RX ORDER — FLUOROURACIL 5 MG/G
CREAM TOPICAL
Qty: 40 | Refills: 1 | Status: ERX | COMMUNITY
Start: 2021-11-30

## 2022-01-03 ENCOUNTER — TELEPHONE (OUTPATIENT)
Dept: ONCOLOGY | Facility: CLINIC | Age: 72
End: 2022-01-03
Payer: COMMERCIAL

## 2022-01-03 ENCOUNTER — APPOINTMENT (OUTPATIENT)
Dept: ONCOLOGY | Facility: CLINIC | Age: 72
End: 2022-01-03
Payer: COMMERCIAL

## 2022-01-03 DIAGNOSIS — D64.9 ANEMIA, UNSPECIFIED TYPE: ICD-10-CM

## 2022-01-03 DIAGNOSIS — C83.398 DIFFUSE LARGE B-CELL LYMPHOMA OF SOLID ORGAN EXCLUDING SPLEEN: ICD-10-CM

## 2022-01-03 LAB
BASOPHILS # BLD AUTO: 0.1 10*3/UL
BASOPHILS NFR BLD AUTO: 1 % (ref 0–2)
EOSINOPHIL # BLD AUTO: 0.3 10*3/UL
EOSINOPHIL NFR BLD AUTO: 4 % (ref 0–3)
ERYTHROCYTE [DISTWIDTH] IN BLOOD BY AUTOMATED COUNT: 14 % (ref 11.5–15)
HCT VFR BLD AUTO: 32.5 % (ref 38–50)
HGB BLD-MCNC: 11.1 G/DL (ref 13.2–17.2)
LYMPHOCYTES # BLD AUTO: 2.4 10*3/UL
LYMPHOCYTES NFR BLD AUTO: 32 % (ref 15–47)
MCH RBC QN AUTO: 30 PG (ref 29–34)
MCHC RBC AUTO-ENTMCNC: 34 G/DL (ref 32–36)
MCV RBC AUTO: 88.2 FL (ref 82–97)
MONOCYTES # BLD AUTO: 0.8 10*3/UL
MONOCYTES NFR BLD AUTO: 11 % (ref 5–13)
NEUTROPHILS # BLD AUTO: 3.7 10*3/UL
NEUTROPHILS NFR BLD AUTO: 51 % (ref 46–70)
PLATELET # BLD AUTO: 246 10*3/UL (ref 130–350)
PMV BLD AUTO: 8.3 FL (ref 6.9–10.8)
RBC # BLD AUTO: 3.69 10*6/ΜL (ref 4.1–5.8)
WBC # BLD AUTO: 7.3 10*3/UL (ref 3.7–9.6)

## 2022-01-03 PROCEDURE — 36415 COLL VENOUS BLD VENIPUNCTURE: CPT

## 2022-01-03 PROCEDURE — 85025 COMPLETE CBC W/AUTO DIFF WBC: CPT

## 2022-01-06 ENCOUNTER — TELEPHONE (OUTPATIENT)
Dept: ONCOLOGY | Facility: CLINIC | Age: 72
End: 2022-01-06
Payer: COMMERCIAL

## 2022-01-06 ENCOUNTER — APPOINTMENT (OUTPATIENT)
Dept: ONCOLOGY | Facility: CLINIC | Age: 72
End: 2022-01-06
Payer: COMMERCIAL

## 2022-01-06 DIAGNOSIS — D50.8 OTHER IRON DEFICIENCY ANEMIA: ICD-10-CM

## 2022-01-06 DIAGNOSIS — D64.9 ANEMIA, UNSPECIFIED TYPE: ICD-10-CM

## 2022-01-06 DIAGNOSIS — C83.398 DIFFUSE LARGE B-CELL LYMPHOMA OF SOLID ORGAN EXCLUDING SPLEEN: ICD-10-CM

## 2022-01-06 DIAGNOSIS — D64.9 ANEMIA, UNSPECIFIED TYPE: Primary | ICD-10-CM

## 2022-01-06 DIAGNOSIS — C82.09: Primary | ICD-10-CM

## 2022-01-06 LAB
ANTIBODY SCREEN: NORMAL
BILIRUB SERPL-MCNC: 0.73 MG/DL (ref 0.2–1.4)
DAT: NORMAL
FERRITIN SERPL-MCNC: 25.8 NG/ML (ref 24–336)
FOLATE SERPL-MCNC: 9.3 NG/ML
HAPTOGLOB SERPL-MCNC: 187 MG/DL (ref 44–215)
IRON SATN MFR SERPL: 11 % (ref 13–50)
IRON SERPL-MCNC: 41 UG/DL (ref 50–175)
LDH SERPL L TO P-CCNC: 112 U/L (ref 87–246)
RETICS/RBC NFR AUTO: 2.2 % (ref 0.5–2.5)
RETICULOCYTE PRODUCTION INDEX: 1.8 RATIO
T3FREE SERPL-MCNC: 3.11 PG/ML (ref 2–3.5)
T4 FREE SERPL-MCNC: 0.73 NG/DL (ref 0.65–1.44)
TIBC SERPL-MCNC: 376 UG/DL (ref 250–450)
TSH SERPL DL<=0.05 MIU/L-ACNC: 6.71 UIU/ML (ref 0.34–4.82)
UIBC SERPL-MCNC: 335 UG/DL (ref 155–355)
VIT B12 SERPL-MCNC: 1282 PG/ML (ref 180–914)

## 2022-01-06 PROCEDURE — 82247 BILIRUBIN TOTAL: CPT

## 2022-01-06 PROCEDURE — 82728 ASSAY OF FERRITIN: CPT

## 2022-01-06 PROCEDURE — 83540 ASSAY OF IRON: CPT

## 2022-01-06 PROCEDURE — 82607 VITAMIN B-12: CPT

## 2022-01-06 PROCEDURE — 82746 ASSAY OF FOLIC ACID SERUM: CPT

## 2022-01-06 PROCEDURE — 84443 ASSAY THYROID STIM HORMONE: CPT

## 2022-01-06 PROCEDURE — 85045 AUTOMATED RETICULOCYTE COUNT: CPT

## 2022-01-06 PROCEDURE — 83615 LACTATE (LD) (LDH) ENZYME: CPT

## 2022-01-06 PROCEDURE — 36415 COLL VENOUS BLD VENIPUNCTURE: CPT

## 2022-01-06 PROCEDURE — 86885 COOMBS TEST INDIRECT QUAL: CPT

## 2022-01-06 PROCEDURE — 84481 FREE ASSAY (FT-3): CPT

## 2022-01-06 PROCEDURE — 83010 ASSAY OF HAPTOGLOBIN QUANT: CPT

## 2022-01-06 PROCEDURE — 84439 ASSAY OF FREE THYROXINE: CPT

## 2022-01-07 NOTE — TELEPHONE ENCOUNTER
Called patient to discuss this weeks labs. He has been taking oral iron therapy 3 days a week.  I discussed his hemoglobin drop could be related to decrease in iron level despite taking oral iron.  We would consider IV iron therapy for this which he would be amenable to.  He states the only thing he has noticed recently is if he eats more fatty meat like red meat or at times with coffee he can have immediate diarrhea.  No further new concerns.  I advised that I want to further discuss with Dr. Girard any other recommendation such as referral to GI at this time versus just treating with IV iron and monitoring response.  This could just be malabsorption due to prior removal of small intestine or could be slow bleeding causing ongoing blood/iron loss.  I will get back to him next week with this information. He states for infusion the date of 1/19 would be best as he will be in [facility] that day.    Addendum:  Will get patient set up for IV Injectafer therapy (750 mg x 2 doses 1 week apart).  Will also refer to GI to consider scope for further evaluation.  Repeat CBC, iron panel labs in 2 months with nurse lab review.

## 2022-01-11 ENCOUNTER — TELEPHONE (OUTPATIENT)
Dept: ONCOLOGY | Facility: CLINIC | Age: 72
End: 2022-01-11
Payer: COMMERCIAL

## 2022-01-11 PROBLEM — D50.9 IRON DEFICIENCY ANEMIA: Status: ACTIVE | Noted: 2022-01-11

## 2022-01-11 RX ORDER — SODIUM CHLORIDE 9 MG/ML
0-999 INJECTION, SOLUTION INTRAVENOUS CONTINUOUS PRN
Status: CANCELLED | OUTPATIENT
Start: 2022-01-12 | End: 2022-01-20

## 2022-01-11 RX ORDER — DIPHENHYDRAMINE HYDROCHLORIDE 50 MG/ML
25-50 INJECTION INTRAMUSCULAR; INTRAVENOUS AS NEEDED
Status: CANCELLED | OUTPATIENT
Start: 2022-01-12

## 2022-01-11 RX ORDER — DIPHENHYDRAMINE HYDROCHLORIDE 50 MG/ML
25-50 INJECTION INTRAMUSCULAR; INTRAVENOUS AS NEEDED
Status: CANCELLED | OUTPATIENT
Start: 2022-01-19

## 2022-01-11 RX ORDER — ACETAMINOPHEN 500 MG
500 TABLET ORAL EVERY 4 HOURS PRN
Status: CANCELLED | OUTPATIENT
Start: 2022-01-19

## 2022-01-11 RX ORDER — EPINEPHRINE 1 MG/ML
0.3 INJECTION INTRAMUSCULAR; INTRAVENOUS; SUBCUTANEOUS AS NEEDED
Status: CANCELLED | OUTPATIENT
Start: 2022-01-19

## 2022-01-11 RX ORDER — EPINEPHRINE 1 MG/ML
0.3 INJECTION INTRAMUSCULAR; INTRAVENOUS; SUBCUTANEOUS AS NEEDED
Status: CANCELLED | OUTPATIENT
Start: 2022-01-12

## 2022-01-11 RX ORDER — ALBUTEROL SULFATE 0.83 MG/ML
2.5 SOLUTION RESPIRATORY (INHALATION) AS NEEDED
Status: CANCELLED | OUTPATIENT
Start: 2022-01-19

## 2022-01-11 RX ORDER — SODIUM CHLORIDE 9 MG/ML
0-999 INJECTION, SOLUTION INTRAVENOUS CONTINUOUS PRN
Status: CANCELLED | OUTPATIENT
Start: 2022-01-19 | End: 2022-01-27

## 2022-01-11 RX ORDER — ACETAMINOPHEN 500 MG
500 TABLET ORAL EVERY 4 HOURS PRN
Status: CANCELLED | OUTPATIENT
Start: 2022-01-12

## 2022-01-11 RX ORDER — FAMOTIDINE 10 MG/ML
20 INJECTION INTRAVENOUS AS NEEDED
Status: CANCELLED | OUTPATIENT
Start: 2022-01-19

## 2022-01-11 RX ORDER — ALBUTEROL SULFATE 0.83 MG/ML
2.5 SOLUTION RESPIRATORY (INHALATION) AS NEEDED
Status: CANCELLED | OUTPATIENT
Start: 2022-01-12

## 2022-01-11 RX ORDER — FAMOTIDINE 10 MG/ML
20 INJECTION INTRAVENOUS AS NEEDED
Status: CANCELLED | OUTPATIENT
Start: 2022-01-12

## 2022-01-11 NOTE — TELEPHONE ENCOUNTER
Called Rivera to let him know there are 2 appts scheduled for Injectafer on 1/12/22 and 1/19/22 at [facility]. Pt verbalizes understanding and voices no concerns.

## 2022-01-12 ENCOUNTER — HOSPITAL ENCOUNTER (OUTPATIENT)
Dept: INFUSION THERAPY | Facility: HOSPITAL | Age: 72
Setting detail: INFUSION SERIES
Discharge: STILL ADMITTED OR ADMINISTRATIVE | End: 2022-01-12
Payer: COMMERCIAL

## 2022-01-12 VITALS
RESPIRATION RATE: 16 BRPM | OXYGEN SATURATION: 98 % | HEART RATE: 55 BPM | TEMPERATURE: 97.3 F | DIASTOLIC BLOOD PRESSURE: 69 MMHG | SYSTOLIC BLOOD PRESSURE: 120 MMHG

## 2022-01-12 DIAGNOSIS — D50.8 OTHER IRON DEFICIENCY ANEMIA: Primary | ICD-10-CM

## 2022-01-12 PROCEDURE — 96365 THER/PROPH/DIAG IV INF INIT: CPT

## 2022-01-12 PROCEDURE — 6360000200 HC RX 636 W HCPCS (ALT 250 FOR IP): Mod: TB | Performed by: NURSE PRACTITIONER

## 2022-01-12 PROCEDURE — 2580000300 HC RX 258: Performed by: NURSE PRACTITIONER

## 2022-01-12 RX ADMIN — FERRIC CARBOXYMALTOSE INJECTION 750 MG: 50 INJECTION, SOLUTION INTRAVENOUS at 13:14

## 2022-01-14 ENCOUNTER — TELEPHONE (OUTPATIENT)
Dept: ONCOLOGY | Facility: CLINIC | Age: 72
End: 2022-01-14
Payer: COMMERCIAL

## 2022-01-14 NOTE — TELEPHONE ENCOUNTER
If patient/family calling feels like their symptoms are emergent they need to go the emergency department.  Nursing will return their call as soon as possible within a 24 hour period (usually before the end of working hours).       Patient: Rivera Juarez    Contact: [phone]    Name of person calling: Rivera    Facility:    Provider: Dr. Girard    Current Treatment:    Symptoms:    Reason for call: Patient called and wanted to know if it was ok for him to continue taking iron pills  3 times a day even though he has been taking an iron infusion. No further information was provided to me over the phone. Please call.     RX to be refilled:N/A    What Pharmacy?

## 2022-01-17 NOTE — TELEPHONE ENCOUNTER
Advised Rivera that he may stop his oral iron supplement.  He voices understanding with acceptance and offers no other questions/concerns at this time.

## 2022-01-19 ENCOUNTER — HOSPITAL ENCOUNTER (OUTPATIENT)
Dept: INFUSION THERAPY | Facility: HOSPITAL | Age: 72
Setting detail: INFUSION SERIES
Discharge: STILL ADMITTED OR ADMINISTRATIVE | End: 2022-01-19
Payer: COMMERCIAL

## 2022-01-19 VITALS
TEMPERATURE: 97.3 F | OXYGEN SATURATION: 97 % | RESPIRATION RATE: 16 BRPM | HEART RATE: 65 BPM | DIASTOLIC BLOOD PRESSURE: 50 MMHG | SYSTOLIC BLOOD PRESSURE: 109 MMHG

## 2022-01-19 DIAGNOSIS — D50.8 OTHER IRON DEFICIENCY ANEMIA: Primary | ICD-10-CM

## 2022-01-19 PROCEDURE — 96365 THER/PROPH/DIAG IV INF INIT: CPT

## 2022-01-19 PROCEDURE — 2580000300 HC RX 258: Performed by: NURSE PRACTITIONER

## 2022-01-19 PROCEDURE — 6360000200 HC RX 636 W HCPCS (ALT 250 FOR IP): Performed by: NURSE PRACTITIONER

## 2022-01-19 RX ADMIN — FERRIC CARBOXYMALTOSE INJECTION 750 MG: 50 INJECTION, SOLUTION INTRAVENOUS at 12:45

## 2022-01-26 PROCEDURE — 87046 STOOL CULTR AEROBIC BACT EA: CPT | Performed by: FAMILY MEDICINE

## 2022-01-26 PROCEDURE — 87077 CULTURE AEROBIC IDENTIFY: CPT | Performed by: FAMILY MEDICINE

## 2022-01-26 PROCEDURE — 87077 CULTURE AEROBIC IDENTIFY: CPT

## 2022-01-26 PROCEDURE — 84999 UNLISTED CHEMISTRY PROCEDURE: CPT

## 2022-01-26 PROCEDURE — 87493 C DIFF AMPLIFIED PROBE: CPT | Performed by: FAMILY MEDICINE

## 2022-01-26 PROCEDURE — 82542 COL CHROMOTOGRAPHY QUAL/QUAN: CPT | Performed by: FAMILY MEDICINE

## 2022-01-26 PROCEDURE — 87045 FECES CULTURE AEROBIC BACT: CPT | Performed by: FAMILY MEDICINE

## 2022-01-27 ENCOUNTER — LAB (OUTPATIENT)
Dept: LAB | Facility: CLINIC | Age: 72
End: 2022-01-27
Payer: COMMERCIAL

## 2022-01-27 DIAGNOSIS — R19.7 DIARRHEA, UNSPECIFIED TYPE: ICD-10-CM

## 2022-01-27 LAB — C DIFF TOX GENS STL QL NAA+PROBE: NEGATIVE

## 2022-01-31 LAB — BACTERIA STL CULT: NORMAL

## 2022-02-03 LAB
BILE ACIDS, % CDCA + CA, F(REF): 0.6 % TOTAL
SPECIMEN WT STL QN: 234 G
TOTAL BILE ACIDS, F(REF): 979 MCMOL/48H

## 2022-03-02 ENCOUNTER — PRE-ADMISSION TESTING (OUTPATIENT)
Dept: PREADMISSION TESTING | Facility: HOSPITAL | Age: 72
End: 2022-03-02
Payer: COMMERCIAL

## 2022-03-02 VITALS
HEIGHT: 68 IN | DIASTOLIC BLOOD PRESSURE: 78 MMHG | SYSTOLIC BLOOD PRESSURE: 139 MMHG | OXYGEN SATURATION: 92 % | RESPIRATION RATE: 16 BRPM | TEMPERATURE: 98.5 F | HEART RATE: 67 BPM | WEIGHT: 184 LBS | BODY MASS INDEX: 27.89 KG/M2

## 2022-03-02 DIAGNOSIS — Z01.818 PRE-OP EVALUATION: Primary | ICD-10-CM

## 2022-03-02 LAB — SARS-COV-2 RDRP RESP QL NAA+PROBE: NEGATIVE

## 2022-03-02 PROCEDURE — C9803 HOPD COVID-19 SPEC COLLECT: HCPCS

## 2022-03-02 PROCEDURE — 87635 SARS-COV-2 COVID-19 AMP PRB: CPT

## 2022-03-02 RX ORDER — FERROUS SULFATE 325(65) MG
650 TABLET ORAL DAILY PRN
COMMUNITY
End: 2022-03-10 | Stop reason: HOSPADM

## 2022-03-02 RX ORDER — ONDANSETRON 4 MG/1
4 TABLET, ORALLY DISINTEGRATING ORAL EVERY 8 HOURS PRN
COMMUNITY
Start: 2022-03-06

## 2022-03-02 RX ORDER — NEOMYCIN SULFATE 500 MG/1
1000 TABLET ORAL 3 TIMES DAILY
COMMUNITY
Start: 2022-03-06 | End: 2022-03-06

## 2022-03-02 RX ORDER — POLYETHYLENE GLYCOL 3350 17 G/17G
238 POWDER, FOR SOLUTION ORAL ONCE
COMMUNITY
Start: 2022-03-06 | End: 2022-03-10 | Stop reason: HOSPADM

## 2022-03-02 RX ORDER — PHENOL/SODIUM PHENOLATE
20 AEROSOL, SPRAY (ML) MUCOUS MEMBRANE DAILY
COMMUNITY

## 2022-03-02 RX ORDER — ACETAMINOPHEN 325 MG/1
650 TABLET ORAL EVERY 6 HOURS PRN
COMMUNITY

## 2022-03-02 RX ORDER — METRONIDAZOLE 500 MG/1
1000 TABLET ORAL 3 TIMES DAILY
COMMUNITY
Start: 2022-03-06 | End: 2022-03-10 | Stop reason: HOSPADM

## 2022-03-02 RX ORDER — MULTIVITAMIN
1 TABLET ORAL DAILY
COMMUNITY

## 2022-03-02 NOTE — PRE-PROCEDURE INSTRUCTIONS
Pre-Surgery Instructions:   Medication Instructions   • omeprazole (PriLOSEC) 20 mg capsule Take morning of surgery/procedure   • calcium carbonate-vitamin D3 600 mg-10 mcg (400 unit) per tablet Do not take morning of surgery/procedure   • acetaminophen (TylenoL) 325 mg tablet Take as needed morning of surgery/procedure   • [START ON 3/6/2022] polyethylene glycol (Miralax) 17 gram/dose powder ERAS bowel prep 3/6/2022   • [START ON 3/6/2022] metroNIDAZOLE (FLAGYL) 500 mg tablet ERAS bowel prep 3/6/2022   • [START ON 3/6/2022] neomycin (MYCIFRADIN) 500 mg tablet ERAS bowel prep 3/6/2022   • [START ON 3/6/2022] bisacodyL 5 mg tablet ERAS bowel prep 3/6/2022   • [START ON 3/6/2022] ondansetron ODT (ZOFRAN-ODT) 4 mg disintegrating tablet Take as needed morning of surgery/procedure   • ferrous sulfate 325 mg (65 mg iron) tablet Do not take morning of surgery/procedure   • atorvastatin (LIPITOR) 10 mg tablet Do not take morning of surgery/procedure   • losartan-hydrochlorothiazide (HYZAAR) 100-12.5 mg per tablet Do not take morning of surgery/procedure   • omega-3 fatty acids-fish oil (Fish OiL) 340-1,000 mg capsule Do not take morning of surgery/procedure   • cyanocobalamin 1,000 mcg tablet Do not take morning of surgery/procedure   • amLODIPine (NORVASC) 10 mg tablet Take morning of surgery/procedure   • citalopram (CeleXA) 20 mg tablet Take morning of surgery/procedure   • metoprolol succinate XL (TOPROL-XL) 25 mg 24 hr tablet Take morning of surgery/procedure           • Leave all medications at home.    • Please check in at Admissions on  3/7 at 0900. Admissions is on the south side of the building.  Access is from the [address].  parking is available from 5:00 am to 6:00 pm Monday through Friday.  The [employer] service is free of charge.     • Your surgery / procedure is scheduled to start at 1100 .    • One person may accompany you when you check in and remain with you until you go for your surgery /  procedure.  At that time your support person will be asked to leave the building.  We will get their phone number so that we can give them updates.  The doctor will visit with them when the surgery / procedure is finished.      • After you are finished in the recovery room and are transferred to your room on the floor, you may have one (1) visitor per 24 hours.  Current visiting hours are 7:30 a.m. to 7:30 p.m.  Any visitor must use Covid precautions: wear a mask at all times, maintain social distancing, wash hands frequently.    · Begin your bowel prep the day before surgery.     • You may eat whatever you wish until 11:00 p.m. the night of 3/5.  After midnight, no more food or solids or dairy products. You may, however, continue to have clear liquids until 2 hours prior to the start of your surgery/procedure.  Examples of clear liquids: water, apple juice, cranberry juice, Gatorade / Powerade, soda pop, tea, black coffee, plain gelatin.  The color of the fluid doesn't matter as long as you can see through it in a diluted state. The more fluids you drink, the better you will feel and the easier it will be to start your IV.    • Please shower the night before surgery.    • After you dry off from your shower, use the [employer]raworx wipes as shown on the back of the package.  All 8 wipes should be used.  ALL body surfaces should be wiped.  Allow to air-dry.    • After your shower, please do not use any lotions, sprays, powder or makeup on your skin.    • You may brush your teeth the morning of surgery / procedure.    • Leave all jewelry, money, credit cards, and valuables at home.      • Please bring cases for your glasses or contacts, dentures, &/or hearing aids.    • If you get a cough, cold, fever, etc before your surgery / procedure, notify your doctor's office as soon as possible.    • If you'd like, you may bring your Advance Directive (Living Will, Power of  for healthcare).  We can scan it into your chart,  then return the original back to you.        For any other questions or concerns, please call the Surgery Pre-Admissions department at [phone].  Messages will be returned.

## 2022-03-07 ENCOUNTER — HOSPITAL ENCOUNTER (INPATIENT)
Facility: HOSPITAL | Age: 72
LOS: 3 days | Discharge: HOME | DRG: 331 | End: 2022-03-10
Attending: SURGERY | Admitting: SURGERY
Payer: MEDICARE

## 2022-03-07 ENCOUNTER — ANESTHESIA EVENT (OUTPATIENT)
Dept: OPERATING ROOM | Facility: HOSPITAL | Age: 72
DRG: 331 | End: 2022-03-07
Payer: MEDICARE

## 2022-03-07 ENCOUNTER — ANESTHESIA (OUTPATIENT)
Dept: OPERATING ROOM | Facility: HOSPITAL | Age: 72
DRG: 331 | End: 2022-03-07
Payer: MEDICARE

## 2022-03-07 DIAGNOSIS — C18.2 MALIGNANT NEOPLASM OF ASCENDING COLON (CMS/HCC): Primary | ICD-10-CM

## 2022-03-07 PROBLEM — C18.9 COLON CANCER (CMS/HCC): Status: ACTIVE | Noted: 2022-03-07

## 2022-03-07 LAB
GLUCOSE BLDC GLUCOMTR-SCNC: 147 MG/DL (ref 70–105)
GLUCOSE BLDC GLUCOMTR-SCNC: 148 MG/DL (ref 70–105)
GLUCOSE BLDC GLUCOMTR-SCNC: 89 MG/DL (ref 70–105)
GLUCOSE BLDC GLUCOMTR-SCNC: 98 MG/DL (ref 70–105)

## 2022-03-07 PROCEDURE — 6360000200 HC RX 636 W HCPCS (ALT 250 FOR IP): Performed by: ANESTHESIOLOGY

## 2022-03-07 PROCEDURE — 6370000100 HC RX 637 (ALT 250 FOR IP): Performed by: ANESTHESIOLOGY

## 2022-03-07 PROCEDURE — (BLANK) HC RECOVERY PHASE-1 EACH ADDITIONAL  1/2 HOUR ACUITY LEVEL 4: Performed by: SURGERY

## 2022-03-07 PROCEDURE — 6360000200 HC RX 636 W HCPCS (ALT 250 FOR IP): Performed by: NURSE PRACTITIONER

## 2022-03-07 PROCEDURE — (BLANK) HC GENERAL ANESTHESIA FACILITY CHARGE 1ST 15 MIN: Performed by: SURGERY

## 2022-03-07 PROCEDURE — (BLANK) HC GENERAL ANESTHESIA FACILITY CHARGE EACH ADDITIONAL MIN: Performed by: SURGERY

## 2022-03-07 PROCEDURE — 00790 ANES IPER UPR ABD NOS: CPT | Performed by: NURSE ANESTHETIST, CERTIFIED REGISTERED

## 2022-03-07 PROCEDURE — 2720000000 HC SUPP 272 WO HCPCS: Performed by: SURGERY

## 2022-03-07 PROCEDURE — (BLANK) HC RECOVERY PHASE-1 1ST  HOUR ACUITY LEVEL 4: Performed by: SURGERY

## 2022-03-07 PROCEDURE — 6370000100 HC RX 637 (ALT 250 FOR IP): Performed by: SURGERY

## 2022-03-07 PROCEDURE — 2580000300 HC RX 258: Performed by: NURSE PRACTITIONER

## 2022-03-07 PROCEDURE — 0DTF4ZZ RESECTION OF RIGHT LARGE INTESTINE, PERCUTANEOUS ENDOSCOPIC APPROACH: ICD-10-PCS | Performed by: SURGERY

## 2022-03-07 PROCEDURE — 6370000100 HC RX 637 (ALT 250 FOR IP): Performed by: NURSE PRACTITIONER

## 2022-03-07 PROCEDURE — 2580000300 HC RX 258: Performed by: ANESTHESIOLOGY

## 2022-03-07 PROCEDURE — 0FT44ZZ RESECTION OF GALLBLADDER, PERCUTANEOUS ENDOSCOPIC APPROACH: ICD-10-PCS | Performed by: SURGERY

## 2022-03-07 PROCEDURE — C1889 IMPLANT/INSERT DEVICE, NOC: HCPCS | Performed by: SURGERY

## 2022-03-07 PROCEDURE — 2500000200 HC RX 250 WO HCPCS: Performed by: ANESTHESIOLOGY

## 2022-03-07 PROCEDURE — (BLANK) HC OR LEVEL 3 PROC EACH ADDITIONAL MIN: Performed by: SURGERY

## 2022-03-07 PROCEDURE — (BLANK) HC OR LEVEL 3 PROC 1ST 15MIN: Performed by: SURGERY

## 2022-03-07 PROCEDURE — 82947 ASSAY GLUCOSE BLOOD QUANT: CPT | Mod: QW

## 2022-03-07 PROCEDURE — (BLANK) HC ROOM SEMI PRIVATE

## 2022-03-07 RX ORDER — LIDOCAINE HYDROCHLORIDE 20 MG/ML
INJECTION, SOLUTION EPIDURAL; INFILTRATION; INTRACAUDAL; PERINEURAL AS NEEDED
Status: DISCONTINUED | OUTPATIENT
Start: 2022-03-07 | End: 2022-03-07 | Stop reason: SURG

## 2022-03-07 RX ORDER — AMLODIPINE BESYLATE 10 MG/1
10 TABLET ORAL EVERY EVENING
Status: DISCONTINUED | OUTPATIENT
Start: 2022-03-07 | End: 2022-03-10 | Stop reason: HOSPADM

## 2022-03-07 RX ORDER — SODIUM CHLORIDE 0.9 % (FLUSH) 0.9 %
2 SYRINGE (ML) INJECTION AS NEEDED
Status: DISCONTINUED | OUTPATIENT
Start: 2022-03-07 | End: 2022-03-07 | Stop reason: HOSPADM

## 2022-03-07 RX ORDER — CELECOXIB 200 MG/1
200 CAPSULE ORAL ONCE
Status: COMPLETED | OUTPATIENT
Start: 2022-03-07 | End: 2022-03-07

## 2022-03-07 RX ORDER — HYDROMORPHONE HYDROCHLORIDE 1 MG/ML
0.4 INJECTION, SOLUTION INTRAMUSCULAR; INTRAVENOUS; SUBCUTANEOUS EVERY 4 HOURS PRN
Status: DISCONTINUED | OUTPATIENT
Start: 2022-03-07 | End: 2022-03-07 | Stop reason: HOSPADM

## 2022-03-07 RX ORDER — DIPHENHYDRAMINE HYDROCHLORIDE 50 MG/ML
25 INJECTION INTRAMUSCULAR; INTRAVENOUS ONCE AS NEEDED
Status: DISCONTINUED | OUTPATIENT
Start: 2022-03-07 | End: 2022-03-07 | Stop reason: HOSPADM

## 2022-03-07 RX ORDER — ROCURONIUM BROMIDE 50 MG/5 ML
SYRINGE (ML) INTRAVENOUS AS NEEDED
Status: DISCONTINUED | OUTPATIENT
Start: 2022-03-07 | End: 2022-03-07 | Stop reason: SURG

## 2022-03-07 RX ORDER — CITALOPRAM 20 MG/1
20 TABLET, FILM COATED ORAL DAILY
Status: DISCONTINUED | OUTPATIENT
Start: 2022-03-08 | End: 2022-03-10 | Stop reason: HOSPADM

## 2022-03-07 RX ORDER — CEFOXITIN 2 G/1
INJECTION, POWDER, FOR SOLUTION INTRAVENOUS AS NEEDED
Status: DISCONTINUED | OUTPATIENT
Start: 2022-03-07 | End: 2022-03-07 | Stop reason: SURG

## 2022-03-07 RX ORDER — LABETALOL HCL 20 MG/4 ML
10 SYRINGE (ML) INTRAVENOUS EVERY 5 MIN PRN
Status: DISCONTINUED | OUTPATIENT
Start: 2022-03-07 | End: 2022-03-07 | Stop reason: HOSPADM

## 2022-03-07 RX ORDER — TRAMADOL HYDROCHLORIDE 50 MG/1
50 TABLET ORAL EVERY 4 HOURS PRN
Status: DISCONTINUED | OUTPATIENT
Start: 2022-03-07 | End: 2022-03-10 | Stop reason: HOSPADM

## 2022-03-07 RX ORDER — FENTANYL CITRATE/PF 50 MCG/ML
50 PLASTIC BAG, INJECTION (ML) INTRAVENOUS EVERY 5 MIN PRN
Status: DISCONTINUED | OUTPATIENT
Start: 2022-03-07 | End: 2022-03-07 | Stop reason: HOSPADM

## 2022-03-07 RX ORDER — LIDOCAINE HYDROCHLORIDE 20 MG/ML
5 JELLY TOPICAL ONCE AS NEEDED
Status: CANCELLED | OUTPATIENT
Start: 2022-03-07

## 2022-03-07 RX ORDER — ACETAMINOPHEN 650 MG/20.3ML
1000 LIQUID ORAL ONCE
Status: COMPLETED | OUTPATIENT
Start: 2022-03-07 | End: 2022-03-07

## 2022-03-07 RX ORDER — DOCUSATE SODIUM 100 MG/1
100 CAPSULE, LIQUID FILLED ORAL 2 TIMES DAILY PRN
Status: DISCONTINUED | OUTPATIENT
Start: 2022-03-07 | End: 2022-03-10 | Stop reason: HOSPADM

## 2022-03-07 RX ORDER — ONDANSETRON HYDROCHLORIDE 2 MG/ML
4 INJECTION, SOLUTION INTRAVENOUS ONCE
Status: COMPLETED | OUTPATIENT
Start: 2022-03-07 | End: 2022-03-07

## 2022-03-07 RX ORDER — BUPIVACAINE HYDROCHLORIDE 2.5 MG/ML
INJECTION, SOLUTION EPIDURAL; INFILTRATION; INTRACAUDAL AS NEEDED
Status: DISCONTINUED | OUTPATIENT
Start: 2022-03-07 | End: 2022-03-07 | Stop reason: HOSPADM

## 2022-03-07 RX ORDER — OXYCODONE HYDROCHLORIDE 5 MG/1
2.5 TABLET ORAL EVERY 4 HOURS PRN
Status: DISCONTINUED | OUTPATIENT
Start: 2022-03-07 | End: 2022-03-10 | Stop reason: HOSPADM

## 2022-03-07 RX ORDER — PREGABALIN 75 MG/1
75 CAPSULE ORAL ONCE
Status: COMPLETED | OUTPATIENT
Start: 2022-03-07 | End: 2022-03-07

## 2022-03-07 RX ORDER — SODIUM CHLORIDE 0.9 % (FLUSH) 0.9 %
2 SYRINGE (ML) INJECTION EVERY 8 HOURS SCHEDULED
Status: DISCONTINUED | OUTPATIENT
Start: 2022-03-07 | End: 2022-03-07 | Stop reason: HOSPADM

## 2022-03-07 RX ORDER — KETAMINE HCL IN 0.9 % NACL 50 MG/5 ML
SYRINGE (ML) INTRAVENOUS AS NEEDED
Status: DISCONTINUED | OUTPATIENT
Start: 2022-03-07 | End: 2022-03-07 | Stop reason: SURG

## 2022-03-07 RX ORDER — TRIPROLIDINE/PSEUDOEPHEDRINE 2.5MG-60MG
400 TABLET ORAL EVERY 6 HOURS SCHEDULED
Status: CANCELLED | OUTPATIENT
Start: 2022-03-10

## 2022-03-07 RX ORDER — OXYCODONE HYDROCHLORIDE 5 MG/1
5 TABLET ORAL EVERY 4 HOURS PRN
Status: DISCONTINUED | OUTPATIENT
Start: 2022-03-07 | End: 2022-03-10 | Stop reason: HOSPADM

## 2022-03-07 RX ORDER — OXYCODONE HYDROCHLORIDE 10 MG/1
10 TABLET ORAL EVERY 4 HOURS PRN
Status: DISCONTINUED | OUTPATIENT
Start: 2022-03-07 | End: 2022-03-10 | Stop reason: HOSPADM

## 2022-03-07 RX ORDER — HYDROMORPHONE HYDROCHLORIDE 1 MG/ML
0.5 INJECTION, SOLUTION INTRAMUSCULAR; INTRAVENOUS; SUBCUTANEOUS
Status: DISCONTINUED | OUTPATIENT
Start: 2022-03-07 | End: 2022-03-10 | Stop reason: HOSPADM

## 2022-03-07 RX ORDER — SODIUM CHLORIDE, SODIUM LACTATE, POTASSIUM CHLORIDE, CALCIUM CHLORIDE 600; 310; 30; 20 MG/100ML; MG/100ML; MG/100ML; MG/100ML
30 INJECTION, SOLUTION INTRAVENOUS CONTINUOUS
Status: DISCONTINUED | OUTPATIENT
Start: 2022-03-07 | End: 2022-03-08

## 2022-03-07 RX ORDER — ONDANSETRON HYDROCHLORIDE 2 MG/ML
4 INJECTION, SOLUTION INTRAVENOUS ONCE AS NEEDED
Status: DISCONTINUED | OUTPATIENT
Start: 2022-03-07 | End: 2022-03-07 | Stop reason: HOSPADM

## 2022-03-07 RX ORDER — SODIUM CHLORIDE 9 MG/ML
25-50 INJECTION, SOLUTION INTRAVENOUS AS NEEDED
Status: DISCONTINUED | OUTPATIENT
Start: 2022-03-07 | End: 2022-03-10 | Stop reason: HOSPADM

## 2022-03-07 RX ORDER — KETOROLAC TROMETHAMINE 30 MG/ML
15 INJECTION, SOLUTION INTRAMUSCULAR; INTRAVENOUS EVERY 6 HOURS SCHEDULED
Status: CANCELLED | OUTPATIENT
Start: 2022-03-07 | End: 2022-03-09

## 2022-03-07 RX ORDER — DEXTROSE MONOHYDRATE, SODIUM CHLORIDE, AND POTASSIUM CHLORIDE 50; 1.49; 4.5 G/1000ML; G/1000ML; G/1000ML
75 INJECTION, SOLUTION INTRAVENOUS CONTINUOUS
Status: DISCONTINUED | OUTPATIENT
Start: 2022-03-07 | End: 2022-03-08

## 2022-03-07 RX ORDER — MAGNESIUM SULFATE HEPTAHYDRATE 40 MG/ML
2 INJECTION, SOLUTION INTRAVENOUS ONCE
Status: CANCELLED | OUTPATIENT
Start: 2022-03-07 | End: 2022-03-07

## 2022-03-07 RX ORDER — METOCLOPRAMIDE HYDROCHLORIDE 5 MG/ML
5 INJECTION INTRAMUSCULAR; INTRAVENOUS EVERY 6 HOURS PRN
Status: DISCONTINUED | OUTPATIENT
Start: 2022-03-07 | End: 2022-03-10 | Stop reason: HOSPADM

## 2022-03-07 RX ORDER — DEXMEDETOMIDINE HYDROCHLORIDE 4 UG/ML
INJECTION, SOLUTION INTRAVENOUS CONTINUOUS PRN
Status: DISCONTINUED | OUTPATIENT
Start: 2022-03-07 | End: 2022-03-07 | Stop reason: SURG

## 2022-03-07 RX ORDER — HEPARIN SODIUM 5000 [USP'U]/ML
5000 INJECTION, SOLUTION INTRAVENOUS; SUBCUTANEOUS EVERY 8 HOURS
Status: DISCONTINUED | OUTPATIENT
Start: 2022-03-08 | End: 2022-03-10 | Stop reason: HOSPADM

## 2022-03-07 RX ORDER — MAGNESIUM SULFATE HEPTAHYDRATE 40 MG/ML
INJECTION, SOLUTION INTRAVENOUS AS NEEDED
Status: DISCONTINUED | OUTPATIENT
Start: 2022-03-07 | End: 2022-03-07 | Stop reason: SURG

## 2022-03-07 RX ORDER — LIDOCAINE HYDROCHLORIDE ANHYDROUS AND DEXTROSE MONOHYDRATE .4; 5 G/100ML; G/100ML
INJECTION, SOLUTION INTRAVENOUS CONTINUOUS PRN
Status: DISCONTINUED | OUTPATIENT
Start: 2022-03-07 | End: 2022-03-07 | Stop reason: SURG

## 2022-03-07 RX ORDER — FENTANYL CITRATE/PF 50 MCG/ML
PLASTIC BAG, INJECTION (ML) INTRAVENOUS AS NEEDED
Status: DISCONTINUED | OUTPATIENT
Start: 2022-03-07 | End: 2022-03-07 | Stop reason: SURG

## 2022-03-07 RX ORDER — ACETAMINOPHEN 10 MG/ML
1000 INJECTION, SOLUTION INTRAVENOUS EVERY 6 HOURS
Status: COMPLETED | OUTPATIENT
Start: 2022-03-07 | End: 2022-03-08

## 2022-03-07 RX ORDER — METOPROLOL SUCCINATE 25 MG/1
25 TABLET, EXTENDED RELEASE ORAL DAILY
Status: DISCONTINUED | OUTPATIENT
Start: 2022-03-07 | End: 2022-03-10 | Stop reason: HOSPADM

## 2022-03-07 RX ORDER — ACETAMINOPHEN 650 MG/20.3ML
1000 LIQUID ORAL EVERY 6 HOURS SCHEDULED
Status: DISCONTINUED | OUTPATIENT
Start: 2022-03-08 | End: 2022-03-10

## 2022-03-07 RX ORDER — MIDAZOLAM HYDROCHLORIDE 1 MG/ML
1 INJECTION INTRAMUSCULAR; INTRAVENOUS EVERY 5 MIN PRN
Status: DISCONTINUED | OUTPATIENT
Start: 2022-03-07 | End: 2022-03-07 | Stop reason: HOSPADM

## 2022-03-07 RX ORDER — PREGABALIN 75 MG/1
75 CAPSULE ORAL ONCE
Status: DISCONTINUED | OUTPATIENT
Start: 2022-03-07 | End: 2022-03-07 | Stop reason: HOSPADM

## 2022-03-07 RX ORDER — LIDOCAINE 560 MG/1
1 PATCH PERCUTANEOUS; TOPICAL; TRANSDERMAL DAILY
Status: DISCONTINUED | OUTPATIENT
Start: 2022-03-07 | End: 2022-03-10 | Stop reason: HOSPADM

## 2022-03-07 RX ORDER — ONDANSETRON HYDROCHLORIDE 2 MG/ML
4 INJECTION, SOLUTION INTRAVENOUS EVERY 6 HOURS PRN
Status: DISCONTINUED | OUTPATIENT
Start: 2022-03-07 | End: 2022-03-10 | Stop reason: HOSPADM

## 2022-03-07 RX ORDER — METOCLOPRAMIDE HYDROCHLORIDE 5 MG/ML
10 INJECTION INTRAMUSCULAR; INTRAVENOUS EVERY 6 HOURS PRN
Status: DISCONTINUED | OUTPATIENT
Start: 2022-03-07 | End: 2022-03-07

## 2022-03-07 RX ORDER — PROPOFOL 10 MG/ML
INJECTION, EMULSION INTRAVENOUS AS NEEDED
Status: DISCONTINUED | OUTPATIENT
Start: 2022-03-07 | End: 2022-03-07 | Stop reason: SURG

## 2022-03-07 RX ORDER — PREGABALIN 75 MG/1
75 CAPSULE ORAL 2 TIMES DAILY
Status: DISCONTINUED | OUTPATIENT
Start: 2022-03-07 | End: 2022-03-10 | Stop reason: HOSPADM

## 2022-03-07 RX ORDER — GLYCOPYRROLATE 0.2 MG/ML
INJECTION INTRAMUSCULAR; INTRAVENOUS AS NEEDED
Status: DISCONTINUED | OUTPATIENT
Start: 2022-03-07 | End: 2022-03-07 | Stop reason: SURG

## 2022-03-07 RX ADMIN — Medication 10 MG: at 13:06

## 2022-03-07 RX ADMIN — CEFOXITIN 2 G: 2 INJECTION, POWDER, FOR SOLUTION INTRAVENOUS at 10:02

## 2022-03-07 RX ADMIN — Medication 10 MG: at 12:50

## 2022-03-07 RX ADMIN — Medication 100 MCG: at 10:22

## 2022-03-07 RX ADMIN — Medication 100 MCG: at 10:44

## 2022-03-07 RX ADMIN — Medication 50 MG: at 09:50

## 2022-03-07 RX ADMIN — Medication 10 MG: at 09:50

## 2022-03-07 RX ADMIN — ACETAMINOPHEN 1000 MG: 10 INJECTION, SOLUTION INTRAVENOUS at 15:59

## 2022-03-07 RX ADMIN — LIDOCAINE HYDROCHLORIDE 100 MG: 20 INJECTION, SOLUTION EPIDURAL; INFILTRATION; INTRACAUDAL; PERINEURAL at 09:50

## 2022-03-07 RX ADMIN — LIDOCAINE HYDROCHLORIDE 2 MG/MIN: 4 INJECTION, SOLUTION INTRAVENOUS at 09:56

## 2022-03-07 RX ADMIN — SODIUM CHLORIDE 50 ML: 9 INJECTION, SOLUTION INTRAVENOUS at 21:08

## 2022-03-07 RX ADMIN — FENTANYL CITRATE 50 MCG: 0.05 INJECTION, SOLUTION INTRAMUSCULAR; INTRAVENOUS at 10:18

## 2022-03-07 RX ADMIN — ACETAMINOPHEN 1000 MG: 650 LIQUID ORAL at 09:10

## 2022-03-07 RX ADMIN — GLYCOPYRROLATE 0.2 MG: 0.2 INJECTION, SOLUTION INTRAMUSCULAR; INTRAVENOUS at 10:25

## 2022-03-07 RX ADMIN — SODIUM CHLORIDE, POTASSIUM CHLORIDE, SODIUM LACTATE AND CALCIUM CHLORIDE 30 ML/HR: 600; 310; 30; 20 INJECTION, SOLUTION INTRAVENOUS at 09:16

## 2022-03-07 RX ADMIN — CEFOXITIN 2 G: 2 INJECTION, POWDER, FOR SOLUTION INTRAVENOUS at 11:50

## 2022-03-07 RX ADMIN — FENTANYL CITRATE 50 MCG: 0.05 INJECTION, SOLUTION INTRAMUSCULAR; INTRAVENOUS at 12:02

## 2022-03-07 RX ADMIN — ONDANSETRON 4 MG: 2 INJECTION INTRAMUSCULAR; INTRAVENOUS at 09:10

## 2022-03-07 RX ADMIN — Medication 100 MCG: at 10:25

## 2022-03-07 RX ADMIN — SUGAMMADEX 200 MG: 100 INJECTION, SOLUTION INTRAVENOUS at 13:26

## 2022-03-07 RX ADMIN — DEXMEDETOMIDINE HYDROCHLORIDE 0.3 MCG/KG/HR: 4 INJECTION, SOLUTION INTRAVENOUS at 09:56

## 2022-03-07 RX ADMIN — MAGNESIUM SULFATE HEPTAHYDRATE 2 G: 40 INJECTION, SOLUTION INTRAVENOUS at 09:56

## 2022-03-07 RX ADMIN — Medication 10 MG: at 11:55

## 2022-03-07 RX ADMIN — Medication 100 MCG: at 10:26

## 2022-03-07 RX ADMIN — Medication 10 MG: at 10:18

## 2022-03-07 RX ADMIN — PROPOFOL 120 MG: 10 INJECTION, EMULSION INTRAVENOUS at 09:50

## 2022-03-07 RX ADMIN — Medication 100 MCG: at 10:12

## 2022-03-07 RX ADMIN — PREGABALIN 75 MG: 75 CAPSULE ORAL at 09:28

## 2022-03-07 RX ADMIN — Medication 100 MCG: at 10:59

## 2022-03-07 RX ADMIN — PREGABALIN 75 MG: 75 CAPSULE ORAL at 21:04

## 2022-03-07 RX ADMIN — Medication 100 MCG: at 11:20

## 2022-03-07 RX ADMIN — LIDOCAINE 1 PATCH: 560 PATCH PERCUTANEOUS; TOPICAL; TRANSDERMAL at 17:21

## 2022-03-07 RX ADMIN — CELECOXIB 200 MG: 200 CAPSULE ORAL at 09:09

## 2022-03-07 RX ADMIN — Medication 10 MG: at 12:10

## 2022-03-07 RX ADMIN — Medication 20 MG: at 11:15

## 2022-03-07 RX ADMIN — FENTANYL CITRATE 50 MCG: 0.05 INJECTION, SOLUTION INTRAMUSCULAR; INTRAVENOUS at 13:32

## 2022-03-07 RX ADMIN — ACETAMINOPHEN 1000 MG: 10 INJECTION, SOLUTION INTRAVENOUS at 21:10

## 2022-03-07 RX ADMIN — PHENYLEPHRINE HYDROCHLORIDE 25 MCG/MIN: 10 INJECTION INTRAVENOUS at 11:00

## 2022-03-07 RX ADMIN — AMLODIPINE BESYLATE 10 MG: 10 TABLET ORAL at 21:05

## 2022-03-07 RX ADMIN — INSULIN HUMAN 1 UNITS/HR: 1 INJECTION, SOLUTION INTRAVENOUS at 11:34

## 2022-03-07 RX ADMIN — Medication 200 MCG: at 08:53

## 2022-03-07 RX ADMIN — FENTANYL CITRATE 50 MCG: 0.05 INJECTION, SOLUTION INTRAMUSCULAR; INTRAVENOUS at 13:30

## 2022-03-07 RX ADMIN — FENTANYL CITRATE 50 MCG: 0.05 INJECTION, SOLUTION INTRAMUSCULAR; INTRAVENOUS at 09:50

## 2022-03-07 RX ADMIN — SODIUM CHLORIDE, POTASSIUM CHLORIDE, SODIUM LACTATE AND CALCIUM CHLORIDE: 600; 310; 30; 20 INJECTION, SOLUTION INTRAVENOUS at 11:21

## 2022-03-07 RX ADMIN — POTASSIUM CHLORIDE, DEXTROSE MONOHYDRATE AND SODIUM CHLORIDE 75 ML/HR: 150; 5; 450 INJECTION, SOLUTION INTRAVENOUS at 17:24

## 2022-03-07 RX ADMIN — Medication 100 MCG: at 10:34

## 2022-03-07 NOTE — ANESTHESIA PREPROCEDURE EVALUATION
"Pre-Procedure Assessment    Patient: Rivera Juarez, male, 71 y.o.    Ht Readings from Last 1 Encounters:   03/02/22 1.727 m (5' 8\")     Wt Readings from Last 1 Encounters:   03/02/22 83.5 kg (184 lb)       Last Vitals  BP      Temp      Pulse     Resp      SpO2      Pain Score         Problem list reviewed and Medical history reviewed           Airway   Mallampati: II  TM distance: >3 FB  Neck ROM: full      Dental      Pulmonary     breath sounds clear to auscultation  Cardiovascular   (+) hypertension,     Rhythm: regular  Rate: normal    Mental Status/Neuro/Psych    Pt is alert.      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\"      Hematology   WBC   Date Value Ref Range Status   01/03/2022 7.3 3.7 - 9.6 10*3/uL Final     RBC   Date Value Ref Range Status   01/03/2022 3.69 (L) 4.10 - 5.80 10*6/µL Final     MCV   Date Value Ref Range Status   01/03/2022 88.2 82.0 - 97.0 fL Final     Hemoglobin   Date Value Ref Range Status   01/03/2022 11.1 (L) 13.2 - 17.2 g/dL Final     Hematocrit   Date Value Ref Range Status   01/03/2022 32.5 (L) 38.0 - 50.0 % Final     Platelets   Date Value Ref Range Status   01/03/2022 246 130 - 350 10*3/uL Final      Coagulation   Protime   Date Value Ref Range Status   02/21/2017 14.1 12.3 - 14.8 SEC      PTT   Date Value Ref Range Status   12/21/2016 30 24 - 37 SEC      INR   Date Value Ref Range Status   02/21/2017 1.1 0.9 - 1.1       General Chemistry   Calcium   Date Value Ref Range Status   11/03/2021 8.6 8.6 - 10.3 mg/dL Final     BUN   Date Value Ref Range Status   11/03/2021 23 7 - 25 mg/dL Final     Creatinine   Date Value Ref Range Status   11/03/2021 1.31 (H) 0.70 - 1.30 " mg/dL Final     Glucose   Date Value Ref Range Status   11/03/2021 163 (H) 70 - 105 mg/dL Final     Sodium   Date Value Ref Range Status   11/03/2021 135 135 - 145 mmol/L Final     Potassium   Date Value Ref Range Status   11/03/2021 3.7 3.5 - 5.1 MMOL/L Final     Magnesium   Date Value Ref Range Status   02/24/2017 1.8 1.8 - 2.4 mg/dL      CO2   Date Value Ref Range Status   11/03/2021 25 21 - 32 mmol/L Final     Chloride   Date Value Ref Range Status   11/03/2021 102 98 - 107 mmol/L Final     Anesthesia Plan    ASA 2   NPO status reviewed: > 6 hours    General         Induction: intravenous   Airway Planning: oral ET tube    Additional Comments: Colorectal eras          Anesthetic plan and risks discussed with patient.      Plan discussed with CRNA.

## 2022-03-07 NOTE — OP NOTE
Rivera WESTON Ann  03/07/22    PREOPERATIVE DIAGNOSIS:  Pre-op Diagnosis     * Malignant neoplasm of colon, unspecified part of colon (CMS/HCC) (HCC) [C18.9]    POSTOPERATIVE DIAGNOSIS:  Post-op Diagnosis     * Malignant neoplasm of colon, unspecified part of colon (CMS/HCC) (HCC) [C18.9]    PROCEDURES:    Procedure:    LAPAROSCOPIC RIGHT HEMICOLECTOMY    Procedure:    LAPAROSCOPIC CHOLECYSTECTOMY  CPT(R) Code:  43795 - [place] LAP,CHOLECYSTECTOMY        SURGEON: Surgeon(s):  Lester Juarez MD      ASSISTANT: Yi Rojo CNP  Assistance was necessary to provide initial set of, retraction of the tissues and organs, laparoscopic camera operation, suture placement, wound closure      ANESTHETIST:  Anesthesiologist: Torrey Hardy MD  CRNA: Keith Vizcarra CRNA; ENRICO Osman CRNA; Luli Pink CRNA  Student Nurse Anesthetist: EBONY Walter       ANESTHESIA TYPE:  General       I/O this shift:  In: 1000 [I.V.:1000]  Out: 55 [Urine:55]    SPECIMENS:   Order Name Source Comment Collection Info Order Time   PATHOLOGY SPECIMEN (HISTOLOGY) Gallbladder  Collected By: Lester Juarez MD 3/7/2022 11:40 AM   PATHOLOGY SPECIMEN (HISTOLOGY) Large Intestine, Right/Ascending Colon  Collected By: Lester Juarez MD 3/7/2022 11:41 AM       IMPLANTS:  Nothing was implanted during the procedure    DRAINS:    Urethral Catheter Double-lumen;Non-latex;Straight-tip 16 Fr. (Active)         COMPLICATIONS:  none      FINDINGS: Minimal inflammatory changes of the gallbladder.  Normal-appearing liver.  No sign of metastatic disease.  No sign of serosal involvement of the colon cancer in the CT    DESCRIPTION OF PROCEDURE:   Patient was brought to the operating room placed in supine position operating table.  After induction of anesthesia ET tube was placed.  Patient was positioned in lithotomy position.  SCDs and warming blanket applied, preoperative antibiotics were given.  Patient was then prepped and draped in usual sterile  fashion.  Intraoperative timeout was performed again the patient the site and procedure be performed.  We began with a supraumbilical local incision at midline.  This is carried down to fascia fascia was incised midline with cautery.  We entered the abdomen with 12 mm trocar.  We insufflated the abdomen with CO2 and inserted a camera for visual inspection.  Findings as above.  3  additional 5 mm trochars were placed.  Trochars were placed in the left lower quadrant and lower midline abdomen.  We began by grasping the right colon and retracting this laterally.  We also visualize the right vascular pedicle at this point.  Peritoneum overlying this pedicle was dissected using LigaSure device.  Using a 45 mm Endo MAHI vascular load we transected the vascular pedicle.  We continued dissection to the lateral pelvic wall in the retroperitoneal space.  Duodenum was observed and preserved.  Dissected to the lateral aspect of the abdominal wall.    At this point we took down attachments of the terminal ileum mobilizing it.  We placed a mesenteric window at the terminal ileum resection point.  Then fired 60 mm Endo MAHI purple load transecting the terminal ileum.  We incised the peritoneum from our close mesenteric dissection to the point of ileal transection and took down the mesentery with LigaSure.  Continued around laterally freeing the mesoappendix and cecal mesentery attachment from the sidewall.  Continued up the descending colon down the peritoneal reflection of the right paracolic gutter.  We then continued up along the hepatic flexure mobilizing the colon medially.  This point we took down omental attachments to the transverse colon and took down the superior attachments of the transverse mesocolon of the proximal portion freeing this up to mobilize medially.  Filmy attachments were taken off of the duodenum.  At this point we had good mobilization of her colon.  We produce a right abdomen transverse incision at 5 mm  trocar extending this incision and placed a Gissel wound protector.  The proximal end of the ileum was brought up through the wound.  We then brought our specimen out through the wound as well.  We will cleared mesentery at the point of resection of the transverse colon firing a 60 mm Endo MAHI purple load across the transverse colon.  Vessel was then free and passes off the field.  We made enterotomies in the remaining ileum and transverse colon and the antimesenteric border and tinea.  Stage silk sutures were used to bring the bowel ends together mated them at the antimesenteric border along the tinea of the colon.  60 mm Endo MAHI stapler purple load was brought in staple tines were mated producing common lumen after firing.  The bowel was closed with running 2 -0 VLock and imbricating 2-0 silk interrupted.  The anastomosis was palpated and felt to be greater than 2 fingerbreadths.  It was placed back into the abdomen.  The region of the abdomen inspected the anastomosis and orientation of the mesentery confirmed that the mesentery was straight.  Cholecystectomy was performed of the laparoscopic technique during the case.  Additional 5 mm trochars placed in the epigastrium and right upper quadrant under direct vision.  Gallbladder was grasped and has an retracted cephalad.  Dissected along the infundibulum of the gallbladder using hook cautery taking the peritoneum off the gallbladder wall.  I dissected around circumferentially the cystic duct and cystic artery.  We achieved the critical view of safety.  We left over 45 mm tan load which were used to transect the cystic duct and the cystic duct infundibular junction.  The cystic artery was doubly clipped and divided.  Gallbladder taken off the cystic plate with cautery.  Once the gallbladder was freed this was placed in Endo Catch bag and removed from a 12 mm trocar site and passed off for pathology analysis.  We achieved hemostasis cauterizing a few areas of the  cystic plate.  Once hemostasis appeared to be achieved abdomen was desufflated and trochars were removed.  We then closed the fascia with 0 PDS in posterior and anterior row of the running layers.  We irrigated the wound copiously and and use Betadine solution on the wound sites.  We looked at the bowel anastomosis within the abdomen and appeared tension free.  Trochars removed under direct vision and abdomen desufflated.  Subcutaneous tissue was approximated with 3-0 Vicryl.  Skin was closed with 4-0 Vicryl in subcuticular fashion.  All sponge counts and counts were correct case patient tolerated procedure well brought back in stable condition.    No blood loss documented.    Lester Juarez MD  Phone Number: [phone]    DATE: 03/07/22      TIME: 1:22 PM

## 2022-03-07 NOTE — INTERVAL H&P NOTE
No change from H&P.  Discussed surgery.  After considering risks and benefits patient wishes to proceed.

## 2022-03-07 NOTE — ANESTHESIA POSTPROCEDURE EVALUATION
Patient: Rivera Juarez    Procedure Summary     Date: 03/07/22 Room / Location: [facility] OR  / [facility] OR    Anesthesia Start: 0947 Anesthesia Stop: 1409    Procedures:       LAPAROSCOPIC RIGHT HEMICOLECTOMY (Right Abdomen)      LAPAROSCOPIC CHOLECYSTECTOMY (N/A Abdomen) Diagnosis:       Malignant neoplasm of colon, unspecified part of colon (CMS/HCC) (HCC)      (Malignant neoplasm of colon, unspecified part of colon (CMS/HCC) (HCC) [C18.9])    Surgeons: Lester Juarez MD Responsible Provider: Torrey Hardy MD    Anesthesia Type: general ASA Status: 2          Anesthesia Type: general    Last vitals  Vitals Value Taken Time   /68 03/07/22 1510   Temp 36.6 °C (97.9 °F) 03/07/22 1402   Pulse 64 03/07/22 1510   Resp 14 03/07/22 1515   SpO2 97 % 03/07/22 1510   0-10 Pain Score 0 - No pain 03/07/22 1500       Anesthesia Post Evaluation    Patient location during evaluation: PACU  Patient participation: complete - patient participated  Level of consciousness: awake and alert  Pain management: adequate  Airway patency: patent  Anesthetic complications: no  Cardiovascular status: acceptable  Respiratory status: acceptable  Hydration status: acceptable  May dismiss recovered patient based on consultation with the appropriate physicians and/or meeting appropriate discharge criteria      Cosmetic?  This procedure is not cosmetic.

## 2022-03-07 NOTE — ANESTHESIA PROCEDURE NOTES
Airway  Date/Time: 3/7/2022 9:55 AM  Urgency: elective    Airway not difficult    General Information and Staff    Patient location during procedure: OR  Anesthesiologist: Torrey Hardy MD  Performed: anesthesiologist     Indications and Patient Condition  Indications for airway management: anesthesia and airway protection  Spontaneous Ventilation: absent  Sedation level: deep  Preoxygenated: yes  Patient position: sniffing  Mask difficulty assessment: 1 - vent by mask    Final Airway Details  Final airway type: endotracheal airway      Successful airway: ETT  Cuffed: yes   Successful intubation technique: direct laryngoscopy  Facilitating devices/methods: stylet  Blade: Neel  Blade size: #3  ETT size (mm): 7.5  Cormack-Lehane Classification: grade I - full view of glottis  Placement verified by: chest auscultation, capnometry and palpation of cuff   Measured from: teeth  ETT to teeth (cm): 23  Number of attempts at approach: 1    Additional Comments  Intubation by med student. Small lip laceration left side upper lip

## 2022-03-07 NOTE — MEDICATION HISTORY SPECIALIST NOTES
Patients medication history was entered into Epic by nurse. Pharmacy Medication History Specialist reviewed nurse's update and verified with resource(s). No discrepancies were noted. Medication History Specialist did not interview this patient; however, will follow up if requested.      Information sources:  EPIC  Complete Dispense Report

## 2022-03-08 LAB
ALBUMIN SERPL-MCNC: 3.8 G/DL (ref 3.5–5.3)
ALP SERPL-CCNC: 81 U/L (ref 45–115)
ALT SERPL-CCNC: 19 U/L (ref 7–52)
ANION GAP SERPL CALC-SCNC: 7 MMOL/L (ref 3–11)
AST SERPL-CCNC: 24 U/L
BASOPHILS # BLD AUTO: 0 10*3/UL
BASOPHILS NFR BLD AUTO: 1 % (ref 0–2)
BILIRUB SERPL-MCNC: 1.01 MG/DL (ref 0.2–1.4)
BUN SERPL-MCNC: 16 MG/DL (ref 7–25)
CALCIUM ALBUM COR SERPL-MCNC: 8.2 MG/DL (ref 8.6–10.3)
CALCIUM SERPL-MCNC: 8 MG/DL (ref 8.6–10.3)
CHLORIDE SERPL-SCNC: 99 MMOL/L (ref 98–107)
CO2 SERPL-SCNC: 23 MMOL/L (ref 21–32)
CREAT SERPL-MCNC: 1.23 MG/DL (ref 0.7–1.3)
EOSINOPHIL # BLD AUTO: 0.1 10*3/UL
EOSINOPHIL NFR BLD AUTO: 1 % (ref 0–3)
ERYTHROCYTE [DISTWIDTH] IN BLOOD BY AUTOMATED COUNT: 15.6 % (ref 11.5–15)
GFR SERPL CREATININE-BSD FRML MDRD: 63 ML/MIN/1.73M*2
GLUCOSE SERPL-MCNC: 130 MG/DL (ref 70–105)
HCT VFR BLD AUTO: 31.5 % (ref 38–50)
HGB BLD-MCNC: 10.9 G/DL (ref 13.2–17.2)
LYMPHOCYTES # BLD AUTO: 1.4 10*3/UL
LYMPHOCYTES NFR BLD AUTO: 17 % (ref 15–47)
MCH RBC QN AUTO: 31.1 PG (ref 29–34)
MCHC RBC AUTO-ENTMCNC: 34.8 G/DL (ref 32–36)
MCV RBC AUTO: 89.4 FL (ref 82–97)
MONOCYTES # BLD AUTO: 1.1 10*3/UL
MONOCYTES NFR BLD AUTO: 13 % (ref 5–13)
NEUTROPHILS # BLD AUTO: 5.4 10*3/UL
NEUTROPHILS NFR BLD AUTO: 68 % (ref 46–70)
PLATELET # BLD AUTO: 190 10*3/UL (ref 130–350)
PMV BLD AUTO: 8.2 FL (ref 6.9–10.8)
POTASSIUM SERPL-SCNC: 3.8 MMOL/L (ref 3.5–5.1)
PROT SERPL-MCNC: 6.1 G/DL (ref 6–8.3)
RBC # BLD AUTO: 3.52 10*6/ΜL (ref 4.1–5.8)
SODIUM SERPL-SCNC: 129 MMOL/L (ref 135–145)
WBC # BLD AUTO: 8 10*3/UL (ref 3.7–9.6)

## 2022-03-08 PROCEDURE — 6360000200 HC RX 636 W HCPCS (ALT 250 FOR IP): Performed by: NURSE PRACTITIONER

## 2022-03-08 PROCEDURE — 6370000100 HC RX 637 (ALT 250 FOR IP): Performed by: SURGERY

## 2022-03-08 PROCEDURE — 6370000100 HC RX 637 (ALT 250 FOR IP): Performed by: NURSE PRACTITIONER

## 2022-03-08 PROCEDURE — 97116 GAIT TRAINING THERAPY: CPT | Mod: GP | Performed by: PHYSICAL THERAPIST

## 2022-03-08 PROCEDURE — 85025 COMPLETE CBC W/AUTO DIFF WBC: CPT | Performed by: NURSE PRACTITIONER

## 2022-03-08 PROCEDURE — 97165 OT EVAL LOW COMPLEX 30 MIN: CPT | Mod: GO | Performed by: OCCUPATIONAL THERAPIST

## 2022-03-08 PROCEDURE — 97161 PT EVAL LOW COMPLEX 20 MIN: CPT | Mod: GP | Performed by: PHYSICAL THERAPIST

## 2022-03-08 PROCEDURE — (BLANK) HC ROOM SEMI PRIVATE

## 2022-03-08 PROCEDURE — 36415 COLL VENOUS BLD VENIPUNCTURE: CPT | Performed by: NURSE PRACTITIONER

## 2022-03-08 PROCEDURE — 80053 COMPREHEN METABOLIC PANEL: CPT | Performed by: NURSE PRACTITIONER

## 2022-03-08 RX ADMIN — PREGABALIN 75 MG: 75 CAPSULE ORAL at 20:02

## 2022-03-08 RX ADMIN — LIDOCAINE 1 PATCH: 560 PATCH PERCUTANEOUS; TOPICAL; TRANSDERMAL at 08:26

## 2022-03-08 RX ADMIN — ACETAMINOPHEN 1000 MG: 650 LIQUID ORAL at 17:34

## 2022-03-08 RX ADMIN — CITALOPRAM HYDROBROMIDE 20 MG: 20 TABLET ORAL at 08:26

## 2022-03-08 RX ADMIN — METOPROLOL SUCCINATE 25 MG: 25 TABLET, EXTENDED RELEASE ORAL at 08:26

## 2022-03-08 RX ADMIN — ACETAMINOPHEN 1000 MG: 650 LIQUID ORAL at 23:43

## 2022-03-08 RX ADMIN — HYDROCHLOROTHIAZIDE 1 DOSE: 12.5 TABLET ORAL at 08:26

## 2022-03-08 RX ADMIN — HEPARIN SODIUM 5000 UNITS: 5000 INJECTION INTRAVENOUS; SUBCUTANEOUS at 08:27

## 2022-03-08 RX ADMIN — ACETAMINOPHEN 1000 MG: 10 INJECTION, SOLUTION INTRAVENOUS at 04:24

## 2022-03-08 RX ADMIN — HEPARIN SODIUM 5000 UNITS: 5000 INJECTION INTRAVENOUS; SUBCUTANEOUS at 23:43

## 2022-03-08 RX ADMIN — HEPARIN SODIUM 5000 UNITS: 5000 INJECTION INTRAVENOUS; SUBCUTANEOUS at 17:35

## 2022-03-08 RX ADMIN — PREGABALIN 75 MG: 75 CAPSULE ORAL at 08:26

## 2022-03-08 RX ADMIN — ACETAMINOPHEN 1000 MG: 10 INJECTION, SOLUTION INTRAVENOUS at 10:04

## 2022-03-08 RX ADMIN — AMLODIPINE BESYLATE 10 MG: 10 TABLET ORAL at 20:02

## 2022-03-08 NOTE — INTERDISCIPLINARY/THERAPY
03/08/22 1043   Time Calculation   Start Time 1043   Stop Time 1051   Time Calculation (min) 8 min   OT Last Visit   OT Received On 03/08/22   General   Chart Reviewed Yes   Therapy Treatment Diagnosis deconditioning s/p colectomy   OT Treatment Duration (Min) 8 Minutes   Is this a Co-Treatment? No   Additional Pertinent History B cell lymphoma, hypertension, hyperlipidemia, anemia, small bowel resection   Family/Caregiver Present No   Precautions   Reinforced Precautions Yes   Other Precautions fall risk   Home Living   Type of Home House   Home Layout One level   Home Access Stairs to enter with rails   Entrance Stairs-Rails Both   Entrance Stairs-Number of Steps 4   Bathroom Shower/Tub Tub/shower unit   Bathroom Equipment None   Home Adaptive Equipment None   Prior Function   Level of [place] Independent with ADLs and functional transfers;Independent with homemaking with ambulation   Lived With Spouse   Prior Function Comments Pt occasionally lives with spouse at her home (pt lives in separate home) and plans to stay with her once d/c'd.   Subjective Comments   RN Stated patient is medically cleared for therapy Yes   Subjective Comments Pt supine in bed upon OT arrival and ends in bathroom preparing for shower with PCC in room to assist.   Pain Assessment Scale   Pain Scale 0-10   0-10 Pain Score 0 - No pain   Cognition   Arousal/Alertness WFL   Cognition Comment Intact. Answers questions appropriatly and able to give PLOF.   Bed Mobility   Bed Mobility Assessed   Bed Mobility 1   Bed Mobility From 1 Supine   Bed Mobility Type 1 To   Bed Mobility to 1 Short sit   Level of Assistance 1 Modified independent   Transfers   Transfer Assessed   Transfer 1   Transfer From 1 Bed;Sit   Transfer Type 1 To   Transfer to 1 Stand   Technique 1 Sit to stand;Stand to sit   Level of Assistance 1 Standby assistance  (for lines/IV pole)   Trials/Comments 1 Pt transferred from sit on EOB to stand without AD and ambulated to  standing at toilet to void bladder given SBA for lines only   Ambulation 1   Surface 1 Smooth;Level surface   Device 1 No device   Assistance 1 Standby assistance   Ambulation Distance (meters) 4 meters   Comments 1 ambulated to bathroom given SBA for lines only   LE Dressing   LE Dressing Yes   Sock Level of Assistance Modified independent   LE Dressing Where Assessed Edge of bed   Toileting   Toileting Level of Assistance Distant supervision   Where Assessed Toilet   Toileting Comments stood at toilet to void bladder   RUE Assessment   RUE Assessment WFL   LUE Assessment   LUE Assessment WFL   Activity Tolerance   Activity Tolerance Comments VSS with good activity tolerance   Assessment   Assessment Comment Pt demo'd good (I) in room for mobility and ADL's. Mostly needs assist with lines only at this time. Will d/c skilled OT, to which pt agrees.   OT Untimed Charges - Quick List (Time Spent in Minutes)   OT Eval Low Complexity 8   Plan   Plan Comment d/c OT

## 2022-03-08 NOTE — PROGRESS NOTES
General Surgery Progress Note     Laparoscopic right hemicolectomy, laparoscopic cholecystectomy      Postop Day: 1 Day Post-Op   Hospital Day:  LOS: 1 day     SUBJECTIVE     Events over the past 24 hours: Pt is overall feeling well today with no fevers, chills, chest pain, or shortness of breath. Incisional pain is controlled on Tylenol. Tolerating clear liquid. negative nausea/vomiting. negative flatus; negative bowel movement. Voiding without difficulty. Ambulating with assistance. No other concerns from patient. No concerns reported by nurse. Vital signs and labs stable.     OBJECTIVE     VITAL SIGNS  Temp:  [36.3 °C (97.3 °F)-36.8 °C (98.3 °F)] 36.3 °C (97.3 °F)  Heart Rate:  [57-81] 60  Resp:  [10-18] 18  BP: ()/(49-82) 130/65  SpO2/FiO2 Ratio Using Approximate FiO2 (%):  [117.5-335.7] 284.4  Estimated P/F Ratio Using Approximate FiO2 (%):  [113.5-290.2] 248.6    I/O last 3 completed shifts:  In: 3956.6 [P.O.:800; I.V.:2811.6; IV Piggyback:345.1]  Out: 400 [Urine:375; Blood:25]         Physical Exam  Gen: alert and oriented times 3  Resp: regular rate, nonlabored  Abd: soft, nondistended, tenderness mild - generalized. Incision healing well and incision well approximated and intact with sutures.  Ext: extremities normal, atraumatic, no cyanosis or edema and no edema, redness or tenderness in the calves or thighs        LABORATORY STUDIES  CBC with Platelet:    Lab Results   Component Value Date    WBC 8.0 03/08/2022    HGB 10.9 (L) 03/08/2022    HCT 31.5 (L) 03/08/2022     03/08/2022     Renal Panel:   Lab Results   Component Value Date     (L) 03/08/2022    K 3.8 03/08/2022    CL 99 03/08/2022    CO2 23 03/08/2022    BUN 16 03/08/2022    CREATININE 1.23 03/08/2022    GLUCOSE 130 (H) 03/08/2022    CALCIUM 8.0 (L) 03/08/2022     Magnesium:   Lab Results   Component Value Date    MG 1.8 02/24/2017     Coags:   Lab Results   Component Value Date    PT 14.1 02/21/2017    APTT 30 12/21/2016     INR 1.1 02/21/2017             ASSESSMENT / PLAN     PLAN      Condition: stable,   awaiting GI function return/improvement.   Diet: full liquid   Activity: up as tolerated, encourage ambulation  VTE Prophylaxis:heparin  GI Prophylaxis: not indicated  Pain: per MAR  Antibiotics: Not indicated  Anticipated Disposition: home   Encourage is      Yi Rojo CNP    Patient and exam were discussed with Dr. Juarez, plan and assessment were determined by Dr. Juarez.

## 2022-03-08 NOTE — NURSING END OF SHIFT
Nursing End of Shift Summary:    Patient: Rivera Juarez  MRN: [id number]  : 1950, Age: 71 y.o.    Location: [address] [place] [address]    Nursing Goals  Clinical Goals for the Shift: safety& comforts    Narrative Summary of Progress Toward Clinical Goals:  Patient is post-op for laparoscopic R hemicolectomy & cholecystectomy. A&Ox4, VS stable. Surgical site dressings are clean, dry & intact, no bleedings noted. Patient c/o pains 3/10 on abdomen, lidocaine patch applied on each side. Spoke to Dr. Juarez, got the order to hold daily dose Metoprolol & Hyzaar till tomorrow due to BP currently boarderline low. Patient stated his belongings are missing (patient did not arrive in  with any items). Spoke to a RN at PACU and writer was told that they will look for them.     Barriers to Goals/Nursing Concerns:  none    New Patient or Family Concerns/Issues:  discharge    Shift Summary:   Significant Events & Communications to Providers (last 12 hours)     Last 5 Values     Row Name 22                   Provider Notification    Reason for Communication Change in status  hold BP meds?  -        Provider Name Dr. TRE Juarez  -              User Key  (r) = Recorded By, (t) = Taken By, (c) = Cosigned By    Initials Name    GILSON England, REAL            Oxygen Usage (last 12 hours)     Last 5 Values    No documentation.             Mobility (last 12 hours)     Last 5 Values     Row Name 22 1402                   Mobility    Anti-Embolism Devices Applied Bilateral;AE calf pump                  Urethral Catheter     Active Urethral Catheter     None            Active Lines     Active Central venous catheter / Peripherally inserted central catheter / Implantable Port / Hemodialysis catheter / Midline Catheter     None              Infusing Medications   Medication Dose Last Rate   • LR  30 mL/hr Stopped (22 171)   • dextrose 5 % and sodium chloride 0.45% with KCL  75 mL/hr 75 mL/hr (22 172)     PRN  Medications   Medication Dose Last Admin   • traMADoL  50 mg     • oxyCODONE  2.5 mg     • traMADoL  50 mg     • oxyCODONE  5 mg     • oxyCODONE  10 mg     • HYDROmorphone  0.5 mg     • benzocaine-menthoL  1 lozenge     • phenoL  1 spray     • docusate sodium  100 mg     • ondansetron  4 mg     • metoclopramide (REGLAN) IV orderable  5 mg       _________________________  Cyndi England RN  03/07/22 6:08 PM

## 2022-03-08 NOTE — INTERDISCIPLINARY/THERAPY
Case Management Admission Note    Phone # [phone]    Living Situation: Spouse/significant other Private residence    [place]          ADLs: Independent  Stairs: Yes 3  HME/CPAP: None      Oxygen: No      Home Health:No     Current Resources: None      Diabetes/supplies: Do you have Diabetes?: No  PCP: BHUMI TADEO MD  Funding: [facility], [facility] only  Pharmacy:[facility] [place] - [address] [facility]    Support Person: Primary Emergency Contact: AnnSharon, Home Phone: [phone], Relation: Spouse  Needs transportation assistance at DC: No     Discharge Needs/Barriers:   May need home O2  Narrative: Met with pt at bedside, introduced self and explained CM role. Pt admitted for colon cancer. PT/OT have evaluated and signed off. 3L O2, none at home. Pt had a lap right hemicolectomy on 3/7. PIV x2, IV acetaminophen, no abx. CM will follow for DC needs and timing.   Dispo: HO

## 2022-03-08 NOTE — PLAN OF CARE
Problem: Safety Adult - Fall  Goal: Free from fall injury  Description: INTERVENTIONS:    Inpatient - Please reference Cares/Safety Flowsheet under Carlos Fall Risk for interventions.  Pediatrics - Please reference Peds Daily Cares/Safety Flowsheet under Stahl Pediatric Fall Assessment Fall Bundle for interventions  LD/OB - Please reference OB Shift Screening Flowsheet under OB Fall Risk for interventions.  Outcome: Progressing     Problem: Pain - Adult  Goal: Verbalizes/displays adequate comfort level or baseline comfort level  Description: INTERVENTIONS:  1. Encourage patient to monitor pain and request interventions  2. Assess pain using the appropriate pain scale  3. Administer analgesics based on type and severity of pain and evaluate response  4. Educate/Implement non-pharmacological measures as appropriate and evaluate response  5. Consider cultural, developmental and social influences on pain and pain management  6. Notify Provider if interventions unsuccessful or patient reports new pain  Outcome: Progressing     Problem: Infection - Adult  Goal: Absence of infection during hospitalization  Description: INTERVENTIONS:  1. Assess and monitor for signs and symptoms of infection  2. Monitor lab/diagnostic results  3. Monitor all insertion sites/wounds/incisions  4. Monitor secretions for changes in amount and color  5. Administer medications as ordered  6. Educate and encourage patient and family to use good hand hygiene technique  7. Identify and educate in appropriate isolation precautions for identified infection/condition  Outcome: Progressing     Problem: Daily Care  Goal: Daily care needs are met  Description: INTERVENTIONS:   1. Assess and monitor skin integrity  2. Identify patients at risk for skin breakdown on admission and per policy  3. Assess and monitor ability to perform self care and identify potential discharge needs  4. Assess skin integrity/risk for skin breakdown  5. Assist patient with  activities of daily living as needed  6. Encourage independent activity per ability   7. Provide mouth care   8. Include patient/family/caregiver in decisions related to daily care   Outcome: Progressing     Problem: Knowledge Deficit  Goal: Patient/family/caregiver demonstrates understanding of disease process, treatment plan, medications, and discharge instructions  Description: INTERVENTIONS:   1. Complete learning assessment and assess knowledge base  2. Provide teaching at level of understanding   3. Provide teaching via preferred learning methods  Outcome: Progressing     Problem: Discharge Barriers  Goal: Patient's discharge needs are met  Description: INTERVENTIONS:  1. Assess patient for self-management skills  2. Encourage participation in management  3. Identify potential discharge barriers on admission and throughout hospital stay  4. Involve patient/family/caregiver in discharge planning process  5. Collaborate with case management/ for discharge needs  Outcome: Progressing      DISPLAY PLAN FREE TEXT

## 2022-03-08 NOTE — INTERDISCIPLINARY/THERAPY
03/08/22 0929   Time Calculation   Start Time 0929   Stop Time 0947   Time Calculation (min) 18 min   PT Last Visit   PT Received On 03/08/22   Visit Number 1   Pain Assessment Scale   Pain Scale   (pt didn't rate pain. Denies pain at rest, but pain with transitional movements)   Pain Interventions Ambulation/increased activity   General   Chart Reviewed Yes   Therapy Treatment Diagnosis Laparoscopic R hemicolectomy, Lap Stephanie   PT Treatment Duration (Min) 18 Minutes   Is this a Co-Treatment? No   Additional Pertinent History PMH: h/o lymphoma, HTN, TIA   Family/Caregiver Present No   Precautions   Reinforced Precautions Yes   Other Precautions Abdominal incision   Home Living   Type of Home House   Home Layout One level   Home Access Stairs to enter with rails   Entrance Stairs-Number of Steps 2   Home Adaptive Equipment None   Prior Function   Level of [place] Independent with homemaking with ambulation;Independent with ADLs and functional transfers   Lived With Spouse   Prior Function Comments Pt was (I) w/o AD at [facility].   Subjective Comments   RN Stated patient is medically cleared for therapy Yes   Subjective Comments RN okayed therapy. Pt in bed, eager to mobilize. Pt in recliner end of tx, call light in reach, all needs met.   Bed Mobility   Bed Mobility Assessed   Bed Mobility 1   Bed Mobility From 1 Supine   Bed Mobility Type 1 To   Bed Mobility to 1 Short sit   Level of Assistance 1 Min assist x 1   Bed Mobility Comments 1 Pt requests a hand to pull trunk upright.   Transfers   Transfer Assessed   Transfer 1   Transfer From 1 Bed;Sit   Transfer Type 1 To and from   Transfer to 1 Stand   Technique 1 Sit to stand;Stand to sit   Transfer Device 1 Transfer belt   Level of Assistance 1 Standby assistance   Trials/Comments 1 Stands with a wide ANTONIO but steady.   Ambulation 1   Surface 1 Level surface;Smooth   Device 1 Gait belt;No device   Assistance 1 Standby assistance   Quality of Gait 1 Wide ANTONIO and  "initially quite stiff LEs, but improves as patient stretches and mobilizes further. He was steady and took a few standing rest breaks to march in place and stretch UEs.   Ambulation Distance (meters) 100 meters   Comments 1 Educated patient in importance of mobilization to promote recovery. Encouraged patient to walk several times a day with nursing staff. He verbalizes agreement and is eager to walk more.   Stairs 1   Comment/# Steps 1 Pt has 2 steps into his home and reports \"absolutely on concerns\". He demonstrates functional strength and balance to safely navigate 2 steps.   Balance   Balance Intact   RLE Assessment   RLE Assessment WFL  (Generalized chronic stiffness - \"been like that my whole life\"; Good strength)   LLE Assessment   LLE Assessment WFL  (Generalized chronic stiffness - \"been like that my whole life\"; Good strength)   Other Activities   Other Activities Comments Pt stood at toilet with distant supervision.   Activity Tolerance   Activity Tolerance Comments Pt on 3L O2 during mobility, SpO2 94% following walk.   Assessment   Barriers to Discharge None   Assessment Comment Rivera is able to mobilize with SBA and tolerates mobility well. No indication for skilled PT in this setting. Would recommend frequent mobilization with nursing staff.   Recommendation   Recommendations for Therapy No further therapy needed   Therapeutic Interventions (Time Spent in Minutes)   Gait/Mobility 8   PT Untimed Charges - Quick List (Time Spent in Minutes)   PT Eval Low Complexity 10   Plan   Plan Comment DC PT     "

## 2022-03-08 NOTE — NURSING END OF SHIFT
Nursing End of Shift Summary:    Patient: Rivera Juarez  MRN: [id number]  : 1950, Age: 71 y.o.    Location: [address] [place] [address]    Nursing Goals  Clinical Goals for the Shift: safety& comforts    Narrative Summary of Progress Toward Clinical Goals:  Patient complained of a little discomfort on post-op site, given due pain meds with adequate relief. Patient freely voided. No complaints otherwise. Kept safe and comfortable.    Barriers to Goals/Nursing Concerns:  No    New Patient or Family Concerns/Issues:  No    Shift Summary:   Significant Events & Communications to Providers (last 12 hours)       Last 5 Values       Row Name 22                   Provider Notification    Reason for Communication Change in status  hold BP meds?  -        Provider Name Dr. TRE Juarez  -                  User Key  (r) = Recorded By, (t) = Taken By, (c) = Cosigned By      Initials Name    GILSON England RN                  Oxygen Usage (last 12 hours)       Last 5 Values       Row Name 22 0400                Oxygen Weaning Trial by Nursing    Is Patient on Room Air OR on the Same Amount of O2 as at Home? No No       Are You Performing the QShift O2 Weaning Trial? No No                     Mobility (last 12 hours)       Last 5 Values       Row Name 22 2100                   Mobility    Anti-Embolism Devices Applied --                      Urethral Catheter       Active Urethral Catheter       None                  Active Lines       Active Central venous catheter / Peripherally inserted central catheter / Implantable Port / Hemodialysis catheter / Midline Catheter       None                  Infusing Medications   Medication Dose Last Rate    LR  30 mL/hr Stopped (22)    dextrose 5 % and sodium chloride 0.45% with KCL  75 mL/hr 75 mL/hr (22)     PRN Medications   Medication Dose Last Admin    traMADoL  50 mg      oxyCODONE  2.5 mg      traMADoL  50 mg      oxyCODONE  5 mg       oxyCODONE  10 mg      HYDROmorphone  0.5 mg      benzocaine-menthoL  1 lozenge      phenoL  1 spray      docusate sodium  100 mg      ondansetron  4 mg      metoclopramide (REGLAN) IV orderable  5 mg      sodium chloride 0.9% (NS)  25-50 mL Rate Verify at 03/07/22 2153     _________________________  Tamy Foster RN  03/08/22 4:47 AM

## 2022-03-08 NOTE — PLAN OF CARE
Problem: Mobility  Goal: STG - Patient will ambulate  Description: 100m with SBA  Outcome: Completed

## 2022-03-08 NOTE — PLAN OF CARE
Problem: Safety Adult - Fall  Goal: Free from fall injury  Description: INTERVENTIONS:    Inpatient - Please reference Cares/Safety Flowsheet under Carlos Fall Risk for interventions.  Pediatrics - Please reference Peds Daily Cares/Safety Flowsheet under Stahl Pediatric Fall Assessment Fall Bundle for interventions  LD/OB - Please reference OB Shift Screening Flowsheet under OB Fall Risk for interventions.  Outcome: Progressing     Problem: Altered Nutrient Intake - Adult  Goal: Nutrient intake appropriate for improving, restoring or maintaining nutritional needs  Description: INTERVENTIONS:  1. Assess nutritional status  2. Monitor oral intake, labs, and treatment plans  3. Provide appropriate diets, oral nutritional supplements, and vitamin/mineral supplements  4. Monitor, and adjust tube feedings and TPN/PPN based on assessed needs  5. Provide specific nutrition education as appropriate  Outcome: Progressing     Problem: Pain - Adult  Goal: Verbalizes/displays adequate comfort level or baseline comfort level  Description: INTERVENTIONS:  1. Encourage patient to monitor pain and request interventions  2. Assess pain using the appropriate pain scale  3. Administer analgesics based on type and severity of pain and evaluate response  4. Educate/Implement non-pharmacological measures as appropriate and evaluate response  5. Consider cultural, developmental and social influences on pain and pain management  6. Notify Provider if interventions unsuccessful or patient reports new pain  Outcome: Progressing     Problem: Infection - Adult  Goal: Absence of infection during hospitalization  Description: INTERVENTIONS:  1. Assess and monitor for signs and symptoms of infection  2. Monitor lab/diagnostic results  3. Monitor all insertion sites/wounds/incisions  4. Monitor secretions for changes in amount and color  5. Administer medications as ordered  6. Educate and encourage patient and family to use good hand hygiene  technique  7. Identify and educate in appropriate isolation precautions for identified infection/condition  Outcome: Progressing     Problem: Daily Care  Goal: Daily care needs are met  Description: INTERVENTIONS:   1. Assess and monitor skin integrity  2. Identify patients at risk for skin breakdown on admission and per policy  3. Assess and monitor ability to perform self care and identify potential discharge needs  4. Assess skin integrity/risk for skin breakdown  5. Assist patient with activities of daily living as needed  6. Encourage independent activity per ability   7. Provide mouth care   8. Include patient/family/caregiver in decisions related to daily care   Outcome: Progressing     Problem: Knowledge Deficit  Goal: Patient/family/caregiver demonstrates understanding of disease process, treatment plan, medications, and discharge instructions  Description: INTERVENTIONS:   1. Complete learning assessment and assess knowledge base  2. Provide teaching at level of understanding   3. Provide teaching via preferred learning methods  Outcome: Progressing     Problem: Discharge Barriers  Goal: Patient's discharge needs are met  Description: INTERVENTIONS:  1. Assess patient for self-management skills  2. Encourage participation in management  3. Identify potential discharge barriers on admission and throughout hospital stay  4. Involve patient/family/caregiver in discharge planning process  5. Collaborate with case management/ for discharge needs  Outcome: Progressing

## 2022-03-09 PROCEDURE — 6360000200 HC RX 636 W HCPCS (ALT 250 FOR IP): Performed by: NURSE PRACTITIONER

## 2022-03-09 PROCEDURE — 6370000100 HC RX 637 (ALT 250 FOR IP): Performed by: SURGERY

## 2022-03-09 PROCEDURE — (BLANK) HC ROOM SEMI PRIVATE

## 2022-03-09 PROCEDURE — 6370000100 HC RX 637 (ALT 250 FOR IP): Performed by: NURSE PRACTITIONER

## 2022-03-09 RX ADMIN — ACETAMINOPHEN 1000 MG: 650 LIQUID ORAL at 05:55

## 2022-03-09 RX ADMIN — METOPROLOL SUCCINATE 25 MG: 25 TABLET, EXTENDED RELEASE ORAL at 08:44

## 2022-03-09 RX ADMIN — CITALOPRAM HYDROBROMIDE 20 MG: 20 TABLET ORAL at 08:45

## 2022-03-09 RX ADMIN — ACETAMINOPHEN 1000 MG: 650 LIQUID ORAL at 18:48

## 2022-03-09 RX ADMIN — PREGABALIN 75 MG: 75 CAPSULE ORAL at 20:02

## 2022-03-09 RX ADMIN — AMLODIPINE BESYLATE 10 MG: 10 TABLET ORAL at 20:01

## 2022-03-09 RX ADMIN — ACETAMINOPHEN 1000 MG: 650 LIQUID ORAL at 11:01

## 2022-03-09 RX ADMIN — HEPARIN SODIUM 5000 UNITS: 5000 INJECTION INTRAVENOUS; SUBCUTANEOUS at 17:11

## 2022-03-09 RX ADMIN — LIDOCAINE 1 PATCH: 560 PATCH PERCUTANEOUS; TOPICAL; TRANSDERMAL at 08:44

## 2022-03-09 RX ADMIN — PREGABALIN 75 MG: 75 CAPSULE ORAL at 08:45

## 2022-03-09 RX ADMIN — HEPARIN SODIUM 5000 UNITS: 5000 INJECTION INTRAVENOUS; SUBCUTANEOUS at 08:45

## 2022-03-09 RX ADMIN — HYDROCHLOROTHIAZIDE 1 DOSE: 12.5 TABLET ORAL at 08:44

## 2022-03-09 NOTE — INTERDISCIPLINARY/THERAPY
Spiritual Care Services:    TAYLOR Durán visited pt and offered pastoral support.  Service will remain available.

## 2022-03-09 NOTE — INTERDISCIPLINARY/THERAPY
Case Management Progress Note    Phone # [phone]    Reason for Admission: Colon CA    Plan of Care:  May DC home later today if tolerating diet okay.    Discussed Discharge Needs/Topics: None at this time    Disposition: HO

## 2022-03-09 NOTE — PROGRESS NOTES
General Surgery Progress Note     Laparoscopic right hemicolectomy, laparoscopic cholecystectomy      Postop Day: 2 Days Post-Op   Hospital Day:  LOS: 2 days     SUBJECTIVE   Pain minimal  Tolerating liquids and having BMs      OBJECTIVE     VITAL SIGNS  Temp:  [35.9 °C (96.7 °F)-36.6 °C (97.8 °F)] 36.5 °C (97.7 °F)  Heart Rate:  [54-73] 73  Resp:  [16-18] 16  BP: (108-130)/(56-72) 130/72  SpO2/FiO2 Ratio Using Approximate FiO2 (%):  [284.4-332.1] 332.1  Estimated P/F Ratio Using Approximate FiO2 (%):  [248.6-287.3] 287.3    I/O last 3 completed shifts:  In: 2999.4 [P.O.:1740; I.V.:959.4; IV Piggyback:300]  Out: 520 [Urine:520]         Physical Exam  Gen: alert and oriented times 3  Resp: regular rate, nonlabored  Abd: soft, nondistended, tenderness mild - generalized. Incision healing well and incision well approximated and intact with steris  Ext: extremities normal, atraumatic, no cyanosis or edema and no edema, redness or tenderness in the calves or thighs        LABORATORY STUDIES  CBC with Platelet:    Lab Results   Component Value Date    WBC 8.0 03/08/2022    HGB 10.9 (L) 03/08/2022    HCT 31.5 (L) 03/08/2022     03/08/2022     Renal Panel:   Lab Results   Component Value Date     (L) 03/08/2022    K 3.8 03/08/2022    CL 99 03/08/2022    CO2 23 03/08/2022    BUN 16 03/08/2022    CREATININE 1.23 03/08/2022    GLUCOSE 130 (H) 03/08/2022    CALCIUM 8.0 (L) 03/08/2022     Magnesium:   Lab Results   Component Value Date    MG 1.8 02/24/2017     Coags:   Lab Results   Component Value Date    PT 14.1 02/21/2017    APTT 30 12/21/2016    INR 1.1 02/21/2017             ASSESSMENT / PLAN     PLAN    Condition: stable,   Bowel functio has returned and tolerating diet   Diet: Low residue diet  Activity: up as tolerated, encourage ambulation  VTE Prophylaxis:heparin  GI Prophylaxis: not indicated  Pain: per MAR  Antibiotics: Not indicated  Anticipated Disposition: home -- may d/c later today if tolerating  diet  Encourage is      STEVE CAO MD

## 2022-03-09 NOTE — NURSING END OF SHIFT
Nursing End of Shift Summary:    Patient: Rivera Juarez  MRN: [id number]  : 1950, Age: 71 y.o.    Location: [address] [place] [address]    Nursing Goals  Clinical Goals for the Shift: Encourage ambulation , pain conr    Narrative Summary of Progress Toward Clinical Goals:  Encouraged patient to use incentive spirometry when awake. Patient able to ambulate around independently. Complained of pain at surgical site, relieved with scheduled Tylenol. Patient was able to pass gas and have a BM. Kept safe and comfortable. No issues overnight.    Barriers to Goals/Nursing Concerns:  No    New Patient or Family Concerns/Issues:  No    Shift Summary:   Significant Events & Communications to Providers (last 12 hours)     Last 5 Values    No documentation.             Oxygen Usage (last 12 hours)     Last 5 Values     Row Name 22 0403                Oxygen Weaning Trial by Nursing    Is Patient on Room Air OR on the Same Amount of O2 as at Home? Yes Yes       Are You Performing the QShift O2 Weaning Trial? No No               Mobility (last 12 hours)     Last 5 Values    No documentation.               Urethral Catheter     Active Urethral Catheter     None            Active Lines     Active Central venous catheter / Peripherally inserted central catheter / Implantable Port / Hemodialysis catheter / Midline Catheter     None              Infusing Medications   Medication Dose Last Rate     PRN Medications   Medication Dose Last Admin   • traMADoL  50 mg     • oxyCODONE  2.5 mg     • traMADoL  50 mg     • oxyCODONE  5 mg     • oxyCODONE  10 mg     • HYDROmorphone  0.5 mg     • benzocaine-menthoL  1 lozenge     • phenoL  1 spray     • docusate sodium  100 mg     • ondansetron  4 mg     • metoclopramide (REGLAN) IV orderable  5 mg     • sodium chloride 0.9% (NS)  25-50 mL Stopped at 22 1004     _________________________  Tamy Foster RN  22 4:05 AM

## 2022-03-10 VITALS
WEIGHT: 182.7 LBS | HEART RATE: 72 BPM | TEMPERATURE: 97.5 F | OXYGEN SATURATION: 95 % | RESPIRATION RATE: 16 BRPM | SYSTOLIC BLOOD PRESSURE: 137 MMHG | BODY MASS INDEX: 27.69 KG/M2 | HEIGHT: 68 IN | DIASTOLIC BLOOD PRESSURE: 79 MMHG

## 2022-03-10 PROCEDURE — 6370000100 HC RX 637 (ALT 250 FOR IP): Performed by: NURSE PRACTITIONER

## 2022-03-10 PROCEDURE — 6370000100 HC RX 637 (ALT 250 FOR IP): Performed by: SURGERY

## 2022-03-10 PROCEDURE — 6360000200 HC RX 636 W HCPCS (ALT 250 FOR IP): Performed by: NURSE PRACTITIONER

## 2022-03-10 RX ORDER — ACETAMINOPHEN 500 MG
1000 TABLET ORAL EVERY 6 HOURS SCHEDULED
Status: DISCONTINUED | OUTPATIENT
Start: 2022-03-10 | End: 2022-03-10 | Stop reason: HOSPADM

## 2022-03-10 RX ADMIN — PREGABALIN 75 MG: 75 CAPSULE ORAL at 08:54

## 2022-03-10 RX ADMIN — HEPARIN SODIUM 5000 UNITS: 5000 INJECTION INTRAVENOUS; SUBCUTANEOUS at 08:54

## 2022-03-10 RX ADMIN — METOPROLOL SUCCINATE 25 MG: 25 TABLET, EXTENDED RELEASE ORAL at 08:55

## 2022-03-10 RX ADMIN — LIDOCAINE 1 PATCH: 560 PATCH PERCUTANEOUS; TOPICAL; TRANSDERMAL at 08:55

## 2022-03-10 RX ADMIN — Medication 1000 MG: at 06:32

## 2022-03-10 RX ADMIN — CITALOPRAM HYDROBROMIDE 20 MG: 20 TABLET ORAL at 08:54

## 2022-03-10 RX ADMIN — ACETAMINOPHEN 1000 MG: 650 LIQUID ORAL at 00:36

## 2022-03-10 RX ADMIN — HEPARIN SODIUM 5000 UNITS: 5000 INJECTION INTRAVENOUS; SUBCUTANEOUS at 00:36

## 2022-03-10 RX ADMIN — HYDROCHLOROTHIAZIDE 100 DOSE: 12.5 TABLET ORAL at 08:54

## 2022-03-10 NOTE — DISCHARGE SUMMARY
Inpatient Discharge Summary    BRIEF OVERVIEW  Admitting Provider: Lester Juarez MD  Discharge Provider: Lester Juarez MD  Primary Care Physician at Discharge: BHUMI TADEO [phone]     Admission Date: 3/7/2022     Discharge Date: 3/10/2022    Problem List  Colon cancer  Anemia   hyperension    Date of Surgery  3/7/2022    Surgeon(s):  Lester Juarez MD     Procedure(s) with comments:  LAPAROSCOPIC RIGHT HEMICOLECTOMY  LAPAROSCOPIC CHOLECYSTECTOMY -    DETAILS OF HOSPITAL STAY      Reason for Admission  Scheduled surgery     Hospital Course  Rivera is a very pleasant 71 year old male that presented to the hospital on 3/7/2022 for his scheduled surgery. Patient had a laparoscopic right hemicolectomy with DR. Juarez. He was admitted to the hospital following surgery for observation, pain/ nausea management, and to await return of bowel function. Patient had a routine postoperative recovery while in the hospital and today on POD #3, the patient felt well and ready for discharge. Vital signs stable, pain controlled, tolerating diet without nausea and vomiting, bladder/ bowel functioning appropriately. Patient ambulating without difficulty. By exam- patient has normal appearance without distress, abdomen soft, appropriately tender, and nondistended. Patient will follow up with general surgery in 1 week or sooner if needed. Lifting restrictions and diet were discussed with patient. Rivera stated understanding and agreement to plan of care. He is to  a stool softener to have on hand when his bowels are less loose. He is to continue tylenol at home for discomfort. If this is not enough for patient he is to contact the clinic.     Physical Exam at Discharge  Discharge Condition: stable  Heart Rate: 61  Resp: 16  BP: 116/71  Temp: 36.4 °C (97.5 °F)  Weight: 82.9 kg (182 lb 11.2 oz)  Gen:  NAD, A&O x3  Cardiac: RRR  Resp: nonlabored breathing, symmetrical chest rise  Abd:  soft, NT, ND, incision  c/d/i   Extr:  no bilat LE edema    Consults ordered during this admission   none    Discharge Disposition   home    Condition at Discharge  Improved and Good    Please see After Visit Summary for discharge instructions, discharge medications, and follow- up instructions.     Yi Rojo CNP    Patient and exam were reviewed with Dr. Juarez, Dr. Juarez established plan of care.

## 2022-03-10 NOTE — DISCHARGE INSTRUCTIONS
GENERAL SURGERY DISCHARGE RECOMMENDATIONS:  1. Activity   ~ No lifting greater than 20 pounds or strenuous activity for 6 weeks.  ~ No excessive bending or twisting. Be cautious of slipping during the winter months.   ~ Walking is encouraged.   ~ Frequent lower leg exercises and foot pumping while sitting help prevent blood   clots. Do not sit for prolonged periods of time.  ~ Continue incentive spirometer use several times per day for 1-2 more weeks to help prevent pneumonia.    2. Pain Medication  ~ No driving while taking prescription pain medication (includes oxycodone, hydrocodone, or tramadol).  You may take over-the-counter acetaminophen and ibuprofen for pain if you are needing to drive.    ~ Do not take acetaminophen if your prescription pain medication includes APAP or acetaminophen.  This is due to potential for overdosing on acetaminophen.  ~ Wean off narcotic pain medication as soon as possible.   ~ Narcotic pain medication can cause constipation. You may take over the counter medication such as colace (docusate sodium), miralax (polyethylene glycol) or magnesium citrate as directed on instructions.     3. Wound/incision Care  ~ Keep incisions clean and dry.  ~ Do not attempt to remove steri strips or skin adhesive, will fall off naturally after 7-10 days.    ~ May shower as needed.  Pat the the incision/wound site dry with a clean towel. Do not scratch or rub the incision/wound   ~ Do not apply any creams, ointments, or powders over/near wounds or incisions unless specifically directed to do so.  ~ No swimming, baths, or any other activity that submerses the incision for 2-3 weeks or until incision/wound is completely healed.    4. Follow up with [facility] general surgery in 1-2 weeks.  An appointment should be made for you prior to you leaving the hospital. If you are discharged on a weekend and your follow up appointment has not been made, please call the clinic on Monday or Tuesday to schedule your  appointment. ([phone]).      5. Each situation is different and requires individual attention. Please call the [facility] clinic immediately if running a fever greater than 101F, increased pain, incision has opened up, if foul smelling/purulent drainage from site, or with any questions or concerns you may have. [phone]

## 2022-03-10 NOTE — INTERDISCIPLINARY/THERAPY
Case Management Discharge Note    Phone # [phone]    Discharge Disposition: HO     Needs transportation assistance at DC: No    Specialty Referrals: General Surgery, PCP         Active Ambulatory Referrals   (From admission, onward)             Start     Ordered    03/10/22 0000  Ambulatory referral to General Surgery        Comments: Please schedule patient with Yi Rojo CNP at [facility] for 1 week follow up following discharge.    03/10/22 05                Support System Notified: by patient    Narrative: No needs identified at time of discharge.

## 2022-03-10 NOTE — NURSING END OF SHIFT
Nursing End of Shift Summary:    Patient: Rivera Juarez  MRN: [id number]  : 1950, Age: 71 y.o.    Location: [address] [place] [address]    Nursing Goals  Clinical Goals for the Shift: Encourage ambulation, , safety and comfort, pain control    Narrative Summary of Progress Toward Clinical Goals:  Ambulating well  Had BM and flatus during shift   Tolerated prescribed meals well  Comfort and safety maintained   Pain managed as prescribed     Barriers to Goals/Nursing Concerns:  Surgical clearance     New Patient or Family Concerns/Issues:  No    Shift Summary:   Significant Events & Communications to Providers (last 12 hours)     Last 5 Values    No documentation.             Oxygen Usage (last 12 hours)     Last 5 Values     Row Name 22 0725                   Oxygen Weaning Trial by Nursing    Is Patient on Room Air OR on the Same Amount of O2 as at Home? Yes                Mobility (last 12 hours)     Last 5 Values     Row Name 22 0725 22 0800 22 1229             Mobility    Activity Ambulate in lacy;Bathroom privileges;Ambulate in room Ambulate in lacy;Ambulate in room;Bathroom privileges Ambulate in lacy;Ambulate in room;Bathroom privileges;Chair;Up ad pa      Distance Ambulated (feet) -- 300 Feet 300 Feet      Distance Ambulated (Meters Calculated) -- 91.44 Meters 91.44 Meters      Distance Ambulated (Meters Calculated)(Do Not Use) -- 91.44 Feet 91.44 Feet                Urethral Catheter     Active Urethral Catheter     None            Active Lines     Active Central venous catheter / Peripherally inserted central catheter / Implantable Port / Hemodialysis catheter / Midline Catheter     None              Infusing Medications   Medication Dose Last Rate     PRN Medications   Medication Dose Last Admin   • traMADoL  50 mg     • oxyCODONE  2.5 mg     • traMADoL  50 mg     • oxyCODONE  5 mg     • oxyCODONE  10 mg     • HYDROmorphone  0.5 mg     • benzocaine-menthoL  1 lozenge     • phenoL  1 spray      • docusate sodium  100 mg     • ondansetron  4 mg     • metoclopramide (REGLAN) IV orderable  5 mg     • sodium chloride 0.9% (NS)  25-50 mL Stopped at 03/08/22 1004     _________________________  Jennifer Fraga RN  03/09/22 6:18 PM

## 2022-03-10 NOTE — NURSING END OF SHIFT
Nursing End of Shift Summary:    Patient: Rivera Juarez  MRN: [id number]  : 1950, Age: 71 y.o.    Location: [address] [place] [address]    Nursing Goals  Clinical Goals for the Shift: Encourage ambulation, , safety and comfort, pain control    Narrative Summary of Progress Toward Clinical Goals:  Rested well overnight; denies pain. (+) return of bowel function and tolerating prescribed diet. Lap sites and dry, with old drainage.   Barriers to Goals/Nursing Concerns:  No    New Patient or Family Concerns/Issues:  No    Shift Summary:   Significant Events & Communications to Providers (last 12 hours)     Last 5 Values    No documentation.             Oxygen Usage (last 12 hours)     Last 5 Values     Row Name 22                   Oxygen Weaning Trial by Nursing    Is Patient on Room Air OR on the Same Amount of O2 as at Home? Yes                Mobility (last 12 hours)     Last 5 Values     Row Name 22 1800                   Mobility    Distance Ambulated (feet) 275 Feet        Distance Ambulated (Meters Calculated) 83.82 Meters        Distance Ambulated (Meters Calculated)(Do Not Use) 83.82 Feet                  Urethral Catheter     Active Urethral Catheter     None            Active Lines     Active Central venous catheter / Peripherally inserted central catheter / Implantable Port / Hemodialysis catheter / Midline Catheter     None              Infusing Medications   Medication Dose Last Rate     PRN Medications   Medication Dose Last Admin   • traMADoL  50 mg     • oxyCODONE  2.5 mg     • traMADoL  50 mg     • oxyCODONE  5 mg     • oxyCODONE  10 mg     • HYDROmorphone  0.5 mg     • benzocaine-menthoL  1 lozenge     • phenoL  1 spray     • docusate sodium  100 mg     • ondansetron  4 mg     • metoclopramide (REGLAN) IV orderable  5 mg     • sodium chloride 0.9% (NS)  25-50 mL Stopped at 22 1004     _________________________  Summer Alcala RN  03/10/22 4:04 AM

## 2022-03-10 NOTE — INTERDISCIPLINARY/THERAPY
Spiritual Care Services:    TAYLOR Marin visited pt and offered pastoral support.  Service will remain available.

## 2022-03-11 ENCOUNTER — TELEPHONE (OUTPATIENT)
Dept: ONCOLOGY | Facility: CLINIC | Age: 72
End: 2022-03-11
Payer: COMMERCIAL

## 2022-03-11 ENCOUNTER — LAB (OUTPATIENT)
Dept: LAB | Facility: CLINIC | Age: 72
End: 2022-03-11
Payer: MEDICARE

## 2022-03-11 ENCOUNTER — PATIENT OUTREACH (OUTPATIENT)
Dept: FAMILY MEDICINE | Facility: CLINIC | Age: 72
End: 2022-03-11
Payer: COMMERCIAL

## 2022-03-11 DIAGNOSIS — D64.9 ANEMIA, UNSPECIFIED TYPE: ICD-10-CM

## 2022-03-11 DIAGNOSIS — D50.8 OTHER IRON DEFICIENCY ANEMIA: ICD-10-CM

## 2022-03-11 LAB
BASOPHILS # BLD AUTO: 0.1 10*3/UL
BASOPHILS NFR BLD AUTO: 1 % (ref 0–2)
EOSINOPHIL # BLD AUTO: 0.2 10*3/UL
EOSINOPHIL NFR BLD AUTO: 3 % (ref 0–3)
ERYTHROCYTE [DISTWIDTH] IN BLOOD BY AUTOMATED COUNT: 15.6 % (ref 11.5–15)
FERRITIN SERPL-MCNC: 333.3 NG/ML (ref 24–336)
HCT VFR BLD AUTO: 34.9 % (ref 38–50)
HGB BLD-MCNC: 11.6 G/DL (ref 13.2–17.2)
IRON SATN MFR SERPL: 17 % (ref 13–50)
IRON SERPL-MCNC: 50 UG/DL (ref 50–175)
LYMPHOCYTES # BLD AUTO: 2.7 10*3/UL
LYMPHOCYTES NFR BLD AUTO: 36 % (ref 15–47)
MCH RBC QN AUTO: 30.3 PG (ref 29–34)
MCHC RBC AUTO-ENTMCNC: 33.4 G/DL (ref 32–36)
MCV RBC AUTO: 90.7 FL (ref 82–97)
MONOCYTES # BLD AUTO: 0.9 10*3/UL
MONOCYTES NFR BLD AUTO: 12 % (ref 5–13)
NEUTROPHILS # BLD AUTO: 3.5 10*3/UL
NEUTROPHILS NFR BLD AUTO: 48 % (ref 46–70)
PLATELET # BLD AUTO: 292 10*3/UL (ref 130–350)
PMV BLD AUTO: 7.9 FL (ref 6.9–10.8)
RBC # BLD AUTO: 3.84 10*6/ΜL (ref 4.1–5.8)
TIBC SERPL-MCNC: 302 UG/DL (ref 250–450)
UIBC SERPL-MCNC: 252 UG/DL (ref 155–355)
WBC # BLD AUTO: 7.5 10*3/UL (ref 3.7–9.6)

## 2022-03-11 PROCEDURE — 82728 ASSAY OF FERRITIN: CPT | Performed by: FAMILY MEDICINE

## 2022-03-11 PROCEDURE — 83550 IRON BINDING TEST: CPT | Performed by: FAMILY MEDICINE

## 2022-03-11 PROCEDURE — 83540 ASSAY OF IRON: CPT | Performed by: FAMILY MEDICINE

## 2022-03-11 PROCEDURE — 36415 COLL VENOUS BLD VENIPUNCTURE: CPT | Performed by: FAMILY MEDICINE

## 2022-03-11 PROCEDURE — 85025 COMPLETE CBC W/AUTO DIFF WBC: CPT | Performed by: FAMILY MEDICINE

## 2022-03-11 NOTE — TELEPHONE ENCOUNTER
Patient with hx of anemia on oncology lab review for 2 month follow up. Patient was recently hospitalized for observation after right hemicolectomy. Patient down for labs and follow up with [facility] 5/3. Ferritin and Iron panel are still pending at this time.     Will review today's results with provider and contact patient with instructions.     Labs today:  Lab Results   Component Value Date    WBC 7.5 03/11/2022    HGB 11.6 (L) 03/11/2022    HCT 34.9 (L) 03/11/2022    MCV 90.7 03/11/2022     03/11/2022

## 2022-03-11 NOTE — PROGRESS NOTES
Rivera Juarez was contacted following recent hospitalization at [facility]. The patient was discharged from the hospital on 3/10/2022 .The Discharge Summary and After Visit Summary were reviewed. The patient's main diagnosis during the hospitalization was laparascopic righjt hemocolectomy.  Followup appointment: is to be made by pt with PCP Any additional testing and labs will be discussed at the patient's upcoming post-hospital follow up appointment.    Transitional Care Management Follow Up (most recent)     Transitional Care Management - 03/11/22 1605        OTHER    Date of post hospital outreach 03/11/22     Contact Status Contact done     Speaking with the Patient or Patient's Caregiver? Patient     Is the patient on the Diabetes registry? N     Were patient's home medications changed or did they have any new medications added during the hospitalization? Y     Did someone go over the discharge summary with the patient or the patient's caregiver and discuss the medications listed on it? Y     Does the patient or patient's caregiver have any questions about the medication changes? no     Patient verbalized understanding of when to contact health care provider or when to get help right away? Y     Discharge instructions reviewed with patient or patient's caregiver and all questions answered? Y     Is there a Home Health/DME Plan being enacted? Please note name of HH agency, DME vendor, contacted/received N     Does patient have psychosocial issues that might impact their health status? None     Does patient have financial barriers to care? None                  Danni Enciso RN  March 11, 2022 4:11 PM

## 2022-03-12 NOTE — TELEPHONE ENCOUNTER
Martha Almeida, CNP  You 1 minute ago (5:00 PM)       He is now s/p recent colon surgery so we know the cause of the anemia now.  Please let him know his hemoglobin is improved from last reading.  We will continue to monitor as scheduled.      Contacted patient to discuss labs and recommendations. Patient verbalized understanding and denied any further questions or concerns at this time.

## 2022-05-13 ENCOUNTER — OFFICE VISIT (OUTPATIENT)
Dept: ONCOLOGY | Facility: CLINIC | Age: 72
End: 2022-05-13
Payer: COMMERCIAL

## 2022-05-13 ENCOUNTER — APPOINTMENT (OUTPATIENT)
Dept: ONCOLOGY | Facility: CLINIC | Age: 72
End: 2022-05-13
Payer: COMMERCIAL

## 2022-05-13 VITALS
OXYGEN SATURATION: 98 % | SYSTOLIC BLOOD PRESSURE: 145 MMHG | HEART RATE: 67 BPM | BODY MASS INDEX: 27.86 KG/M2 | DIASTOLIC BLOOD PRESSURE: 68 MMHG | WEIGHT: 183.2 LBS | TEMPERATURE: 97.7 F

## 2022-05-13 DIAGNOSIS — C83.398 DIFFUSE LARGE B-CELL LYMPHOMA OF SOLID ORGAN EXCLUDING SPLEEN: ICD-10-CM

## 2022-05-13 LAB
ALBUMIN SERPL-MCNC: 4.6 G/DL (ref 3.5–5.3)
ALP SERPL-CCNC: 111 U/L (ref 45–115)
ALT SERPL-CCNC: 16 U/L (ref 7–52)
ANION GAP SERPL CALC-SCNC: 7 MMOL/L (ref 3–11)
AST SERPL-CCNC: 19 U/L
BASOPHILS # BLD AUTO: 0.1 10*3/UL
BASOPHILS NFR BLD AUTO: 1 % (ref 0–2)
BILIRUB SERPL-MCNC: 1.01 MG/DL (ref 0.2–1.4)
BUN SERPL-MCNC: 19 MG/DL (ref 7–25)
CALCIUM ALBUM COR SERPL-MCNC: 9.3 MG/DL (ref 8.6–10.3)
CALCIUM SERPL-MCNC: 9.8 MG/DL (ref 8.6–10.3)
CHLORIDE SERPL-SCNC: 102 MMOL/L (ref 98–107)
CO2 SERPL-SCNC: 28 MMOL/L (ref 21–32)
CREAT SERPL-MCNC: 1.21 MG/DL (ref 0.7–1.3)
EOSINOPHIL # BLD AUTO: 0.2 10*3/UL
EOSINOPHIL NFR BLD AUTO: 3 % (ref 0–3)
ERYTHROCYTE [DISTWIDTH] IN BLOOD BY AUTOMATED COUNT: 14.4 % (ref 11.5–15)
GFR SERPL CREATININE-BSD FRML MDRD: 64 ML/MIN/1.73M*2
GLUCOSE SERPL-MCNC: 95 MG/DL (ref 70–105)
HCT VFR BLD AUTO: 38.1 % (ref 38–50)
HGB BLD-MCNC: 13.1 G/DL (ref 13.2–17.2)
LDH SERPL L TO P-CCNC: 129 U/L (ref 87–246)
LYMPHOCYTES # BLD AUTO: 2.7 10*3/UL
LYMPHOCYTES NFR BLD AUTO: 38 % (ref 15–47)
MCH RBC QN AUTO: 30 PG (ref 29–34)
MCHC RBC AUTO-ENTMCNC: 34.5 G/DL (ref 32–36)
MCV RBC AUTO: 87 FL (ref 82–97)
MONOCYTES # BLD AUTO: 0.9 10*3/UL
MONOCYTES NFR BLD AUTO: 12 % (ref 5–13)
NEUTROPHILS # BLD AUTO: 3.3 10*3/UL
NEUTROPHILS NFR BLD AUTO: 46 % (ref 46–70)
PLATELET # BLD AUTO: 212 10*3/UL (ref 130–350)
PMV BLD AUTO: 8 FL (ref 6.9–10.8)
POTASSIUM SERPL-SCNC: 4.5 MMOL/L (ref 3.5–5.1)
PROT SERPL-MCNC: 7.5 G/DL (ref 6–8.3)
RBC # BLD AUTO: 4.38 10*6/ΜL (ref 4.1–5.8)
SODIUM SERPL-SCNC: 137 MMOL/L (ref 135–145)
WBC # BLD AUTO: 7.1 10*3/UL (ref 3.7–9.6)

## 2022-05-13 PROCEDURE — 80053 COMPREHEN METABOLIC PANEL: CPT

## 2022-05-13 PROCEDURE — 36415 COLL VENOUS BLD VENIPUNCTURE: CPT

## 2022-05-13 PROCEDURE — 85025 COMPLETE CBC W/AUTO DIFF WBC: CPT

## 2022-05-13 PROCEDURE — 99212 OFFICE O/P EST SF 10 MIN: CPT | Performed by: INTERNAL MEDICINE

## 2022-05-13 PROCEDURE — 83615 LACTATE (LD) (LDH) ENZYME: CPT

## 2022-05-13 PROCEDURE — G0463 HOSPITAL OUTPT CLINIC VISIT: HCPCS

## 2022-05-13 ASSESSMENT — PAIN SCALES - GENERAL: PAINLEVEL: 0-NO PAIN

## 2022-05-13 NOTE — PROGRESS NOTES
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  Patient received 6 cycles of R-CHOP in 2017    INTERVAL HISTORY:    Patient recently underwent right hemicolectomy.  Pathology showed invasive moderately differentiated adenocarcinoma with mucinous features.  Stage consistent with pT2 pN0.  He is recovering well from surgery.      Oncology history:  The patient was diagnosed with non-Hodgkin's lymphoma when he underwent surgery for a small-bowel obstruction in February 2017.  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  His PET scan showed FDG avid large mesenteric mass.  His bone marrow biopsy and brain imagings were negative.  As a result, with the diagnosis of stage IE diffuse large B-cell lymphoma involving small bowel, he was recommended to undergo chemotherapy with R-CHOP.  Our purpose was to eradicate the high-grade component of his lymphoma.  He received 6 cycles of R-CHOP between March and July 2017.  The PET scan done in August 2017 showed complete response.      Patient underwent right hemicolectomy in March 2022.  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  Overall stage I.    REVIEW OF SYSTEMS:   A complete review of systems was completed.  Pertinents noted in interval history; otherwise negative.    Past Medical History  Past Medical History:   Diagnosis Date   • Dental disease     several missing   • Diffuse large b-cell lymphoma, extranodal and solid organ sites (CMS/HCC) (HCC)    • Gastrointestinal disease     gall bladder stones w/ intermittent pain    • GERD (gastroesophageal reflux disease)    • Hypertension    • Lymphoma (CMS/HCC) (HCC)    • Personal history of transient ischemic attack    • Stroke (CMS/HCC) (HCC) 12/2016    mild residual L lip numbness and L hand discoordination        Past Surgical History  Past Surgical History:    Procedure Laterality Date   • BOWEL RESECTION  02/21/2017    Small   • BOWEL RESECTION Right 3/7/2022    Procedure: LAPAROSCOPIC RIGHT HEMICOLECTOMY;  Surgeon: Lester Juarez MD;  Location: [facility] OR;  Service: General;  Laterality: Right;   • CHOLECYSTECTOMY N/A 3/7/2022    Procedure: LAPAROSCOPIC CHOLECYSTECTOMY;  Surgeon: Lester Juarez MD;  Location: [facility] OR;  Service: General;  Laterality: N/A;  moved to 0800 per Polly at clinic. Contacted pt and pt was ok with new time.   • FRACTURE SURGERY Right late '60's    wrist ORIF w/ bone grafting       MEDICATIONS:    Current Outpatient Medications   Medication Sig Dispense Refill   • omeprazole (PriLOSEC) 20 mg tablet,delayed release (DR/EC) Take 20 mg by mouth daily     • calcium carbonate-vitamin D3 600 mg-10 mcg (400 unit) per tablet Take 1 tablet by mouth daily     • atorvastatin (LIPITOR) 10 mg tablet Take 10 mg by mouth every evening     • losartan-hydrochlorothiazide (HYZAAR) 100-12.5 mg per tablet Take 1 tablet by mouth daily     • omega-3 fatty acids-fish oil (Fish OiL) 340-1,000 mg capsule Take 1 capsule by mouth daily     • cyanocobalamin 1,000 mcg tablet Take 1,000 mcg by mouth daily     • amLODIPine (NORVASC) 10 mg tablet Take 10 mg by mouth every evening     • citalopram (CeleXA) 20 mg tablet Take 20 mg by mouth daily     • metoprolol succinate XL (TOPROL-XL) 25 mg 24 hr tablet Take 25 mg by mouth daily     • acetaminophen (TYLENOL) 325 mg tablet Take 650 mg by mouth every 6 (six) hours as needed     • ondansetron ODT (ZOFRAN-ODT) 4 mg disintegrating tablet Take 4 mg by mouth every 8 (eight) hours as needed for nausea or vomiting       No current facility-administered medications for this visit.       Allergies  Allergies have been reviewed.  Rivera is allergic to shrimp.    Social History  Rivera  reports that he has never smoked. He quit smokeless tobacco use about 21 years ago.  His smokeless tobacco use included chew. He reports previous alcohol use. He  reports that he does not use drugs.    Family History  Family History   Problem Relation Age of Onset   • Multiple sclerosis Mother    • Heart disease Mother    • Lung cancer Father    • Prostate cancer Father    • Cancer Father        PHYSICAL EXAMINATION:  Wt 83.1 kg (183 lb 3.2 oz)   BMI 27.86 kg/m²   Wt Readings from Last 3 Encounters:   05/13/22 83.1 kg (183 lb 3.2 oz)   03/08/22 82.9 kg (182 lb 11.2 oz)   03/02/22 83.5 kg (184 lb)      General: Pleasant,in no acute distress  Head: Normocephalic, atraumatic  Eyes: No scleral icterus  Oral cavity: Mucous membranes moist, no oral ulcerations/lesions appreciated  Lymphatic system: No cervical, supraclavicular, axillary, or inguinal lymphadenopathy palpable  Lungs: Clear to auscultation bilaterally without wheezes or crackles  Heart: Regular rate and rhythm without murmur  Abdomen: Soft, non-tender, non-distended. Normoactive bowel sounds.  No palpable hepatosplenomegaly.  Extremities: No cyanosis or edema noted  Skin: No obvious rashes or lesion  Psych: Normal affect, no obvious signs of anxiety/depression    RESULTS REVIEWED:   Results for orders placed or performed in visit on 05/13/22   CBC w/auto differential Blood, Venous   Result Value Ref Range    WBC 7.1 3.7 - 9.6 10*3/uL    RBC 4.38 4.10 - 5.80 10*6/µL    Hemoglobin 13.1 (L) 13.2 - 17.2 g/dL    Hematocrit 38.1 38.0 - 50.0 %    MCV 87.0 82.0 - 97.0 fL    MCH 30.0 29.0 - 34.0 pg    MCHC 34.5 32.0 - 36.0 g/dL    RDW 14.4 11.5 - 15.0 %    Platelets 212 130 - 350 10*3/uL    MPV 8.0 6.9 - 10.8 fL    Neutrophils% 46 46 - 70 %    Lymphocytes% 38 15 - 47 %    Monocytes% 12 5 - 13 %    Eosinophils% 3 0 - 3 %    Basophils% 1 0 - 2 %    ANC (auto diff) 3.30 10*3/UL    Lymphocytes Absolute 2.70 10*3/uL    Monocytes Absolute 0.90 10*3/uL    Eosinophils Absolute 0.20 10*3/uL    Basophils Absolute 0.10 10*3/uL   Comprehensive metabolic panel Blood, Venous   Result Value Ref Range    Sodium 137 135 - 145 mmol/L     Potassium 4.5 3.5 - 5.1 MMOL/L    Chloride 102 98 - 107 mmol/L    CO2 28 21 - 32 mmol/L    Anion Gap 7 3 - 11 mmol/L    BUN 19 7 - 25 mg/dL    Creatinine 1.21 0.70 - 1.30 mg/dL    Glucose 95 70 - 105 mg/dL    Calcium 9.8 8.6 - 10.3 mg/dL    AST 19 <40 U/L    ALT (SGPT) 16 7 - 52 U/L    Alkaline Phosphatase 111 45 - 115 U/L    Total Protein 7.5 6.0 - 8.3 g/dL    Albumin 4.6 3.5 - 5.3 g/dL    Total Bilirubin 1.01 0.20 - 1.40 mg/dL    Corrected Calcium 9.3 8.6 - 10.3 mg/dL    eGFR 64 >60 mL/min/1.73m*2   LDH (Lactate dehydrogenase) Blood, Venous   Result Value Ref Range     87 - 246 U/L          IMPRESSION/PLAN:     #Colon cancer, stage I.  I reviewed pathology report in detail with him today.  He will not require any imaging for surveillance.  Would recommend a repeat colonoscopy in 1 year.    #Diffuse large B-cell lymphoma.  Patient underwent surgery for a small bowel lesion in February 2017.  Pathology showed both low-grade lymphoma as well as diffuse large B-cell lymphoma involving the bowel.  He underwent chemotherapy with R-CHOP for 6 cycles in 2017 with complete response.    He will return in 6 months with repeat labs    NCCN guidelines were consulted in order to help in the management of the patient     Avi Girard MD, PhD  Hematology and Oncology

## 2022-10-12 ENCOUNTER — APPOINTMENT (OUTPATIENT)
Dept: ONCOLOGY | Facility: CLINIC | Age: 72
End: 2022-10-12
Payer: COMMERCIAL

## 2022-10-12 ENCOUNTER — TELEPHONE (OUTPATIENT)
Dept: FAMILY MEDICINE | Facility: CLINIC | Age: 72
End: 2022-10-12

## 2022-10-12 ENCOUNTER — OFFICE VISIT (OUTPATIENT)
Dept: ONCOLOGY | Facility: CLINIC | Age: 72
End: 2022-10-12
Payer: COMMERCIAL

## 2022-10-12 VITALS
HEART RATE: 80 BPM | SYSTOLIC BLOOD PRESSURE: 140 MMHG | DIASTOLIC BLOOD PRESSURE: 60 MMHG | TEMPERATURE: 98 F | OXYGEN SATURATION: 91 % | WEIGHT: 182.4 LBS | BODY MASS INDEX: 27.73 KG/M2

## 2022-10-12 DIAGNOSIS — C83.398 DIFFUSE LARGE B-CELL LYMPHOMA OF SOLID ORGAN EXCLUDING SPLEEN: ICD-10-CM

## 2022-10-12 LAB
ALBUMIN SERPL-MCNC: 4.3 G/DL (ref 3.5–5.3)
ALP SERPL-CCNC: 89 U/L (ref 45–115)
ALT SERPL-CCNC: 11 U/L (ref 7–52)
ANION GAP SERPL CALC-SCNC: 8 MMOL/L (ref 3–11)
AST SERPL-CCNC: 16 U/L
BASOPHILS # BLD AUTO: 0.1 10*3/UL
BASOPHILS NFR BLD AUTO: 1.2 % (ref 0–2)
BILIRUB SERPL-MCNC: 1.12 MG/DL (ref 0.2–1.4)
BUN SERPL-MCNC: 34 MG/DL (ref 7–25)
CALCIUM ALBUM COR SERPL-MCNC: 9.2 MG/DL (ref 8.6–10.3)
CALCIUM SERPL-MCNC: 9.4 MG/DL (ref 8.6–10.3)
CHLORIDE SERPL-SCNC: 102 MMOL/L (ref 98–107)
CO2 SERPL-SCNC: 26 MMOL/L (ref 21–32)
CREAT SERPL-MCNC: 1.56 MG/DL (ref 0.7–1.3)
EOSINOPHIL # BLD AUTO: 0.2 10*3/UL
EOSINOPHIL NFR BLD AUTO: 2.6 % (ref 0–3)
ERYTHROCYTE [DISTWIDTH] IN BLOOD BY AUTOMATED COUNT: 13.3 % (ref 11.5–15)
GFR SERPL CREATININE-BSD FRML MDRD: 47 ML/MIN/1.73M*2
GLUCOSE SERPL-MCNC: 84 MG/DL (ref 70–105)
HCT VFR BLD AUTO: 40.3 % (ref 38–50)
HGB BLD-MCNC: 13.5 G/DL (ref 13.2–17.2)
LDH SERPL L TO P-CCNC: 177 U/L (ref 87–246)
LYMPHOCYTES # BLD AUTO: 3 10*3/UL
LYMPHOCYTES NFR BLD AUTO: 38.5 % (ref 15–47)
MCH RBC QN AUTO: 29.9 PG (ref 29–34)
MCHC RBC AUTO-ENTMCNC: 33.4 G/DL (ref 32–36)
MCV RBC AUTO: 89.5 FL (ref 82–97)
MONOCYTES # BLD AUTO: 1.1 10*3/UL
MONOCYTES NFR BLD AUTO: 13.5 % (ref 5–13)
NEUTROPHILS # BLD AUTO: 3.4 10*3/UL
NEUTROPHILS NFR BLD AUTO: 44.2 % (ref 46–70)
PLATELET # BLD AUTO: 195 10*3/UL (ref 130–350)
PMV BLD AUTO: 8.3 FL (ref 6.9–10.8)
POTASSIUM SERPL-SCNC: 4.2 MMOL/L (ref 3.5–5.1)
PROT SERPL-MCNC: 7.2 G/DL (ref 6–8.3)
RBC # BLD AUTO: 4.5 10*6/ΜL (ref 4.1–5.8)
SODIUM SERPL-SCNC: 136 MMOL/L (ref 135–145)
WBC # BLD AUTO: 7.8 10*3/UL (ref 3.7–9.6)

## 2022-10-12 PROCEDURE — 83615 LACTATE (LD) (LDH) ENZYME: CPT

## 2022-10-12 PROCEDURE — 85025 COMPLETE CBC W/AUTO DIFF WBC: CPT

## 2022-10-12 PROCEDURE — 80053 COMPREHEN METABOLIC PANEL: CPT

## 2022-10-12 PROCEDURE — 99213 OFFICE O/P EST LOW 20 MIN: CPT | Performed by: NURSE PRACTITIONER

## 2022-10-12 PROCEDURE — 36415 COLL VENOUS BLD VENIPUNCTURE: CPT

## 2022-10-12 NOTE — PROGRESS NOTES
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  Patient received 6 cycles of R-CHOP in 2017.    Patient underwent right hemicolectomy in March 2022.  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  Overall stage I    He has no health complaints today states overall he is feeling quite well.  His weight is stable and his energy is up to par.  He is not having any issues with any new pain that is persistent/consistent.  No night sweats or lymphadenopathy.  He has not had any acute illnesses over the last 6 months.    His last visit at cancer care was in May.  6-month follow-up was recommended.  Patient is here early as he is leaving for [place] in the very near future.    Subjective   HISTORY OF PRESENT ILLNESS:  The patient was diagnosed with non-Hodgkin's lymphoma when he underwent surgery for a small-bowel obstruction in February 2017.  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  His PET scan showed FDG avid large mesenteric mass.  His bone marrow biopsy and brain imagings were negative.  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  Our purpose was to eradicate the high-grade component of his lymphoma.  He received 6 cycles of R-CHOP between March and July 2017.  The PET scan done in August 2017 showed complete response.       Patient underwent right hemicolectomy in March 2022.  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  Overall stage I.       Oncology History    No history exists.         PAST MEDICAL HISTORY:   Past Medical History:   Diagnosis Date    Dental disease     several missing    Diffuse large b-cell lymphoma, extranodal and solid organ sites  "(CMS/HCC) (HCC)     Gastrointestinal disease     gall bladder stones w/ intermittent pain     GERD (gastroesophageal reflux disease)     Hypertension     Lymphoma (CMS/HCC) (HCC)     Personal history of transient ischemic attack     Stroke (CMS/HCC) (HCC) 12/2016    mild residual L lip numbness and L hand discoordination         PAST SURGICAL HISTORY:   Past Surgical History:   Procedure Laterality Date    BOWEL RESECTION  02/21/2017    Small    BOWEL RESECTION Right 3/7/2022    Procedure: LAPAROSCOPIC RIGHT HEMICOLECTOMY;  Surgeon: Lester Juarez MD;  Location: [facility] OR;  Service: General;  Laterality: Right;    CHOLECYSTECTOMY N/A 3/7/2022    Procedure: LAPAROSCOPIC CHOLECYSTECTOMY;  Surgeon: Lester Juarez MD;  Location: [facility] OR;  Service: General;  Laterality: N/A;  moved to 0800 per Polly at clinic. Contacted pt and pt was ok with new time.    FRACTURE SURGERY Right late '60's    wrist ORIF w/ bone grafting        GYNECOLOGICAL HISTORY (if applicable):     FAMILY HISTORY:   Family History   Problem Relation Age of Onset    Multiple sclerosis Mother     Heart disease Mother     Lung cancer Father     Prostate cancer Father     Cancer Father         SOCIAL HISTORY:   Social History     Socioeconomic History    Marital status:      Spouse name: Not on file    Number of children: Not on file    Years of education: Not on file    Highest education level: Not on file   Occupational History    Not on file   Tobacco Use    Smoking status: Never    Smokeless tobacco: Former     Types: Chew     Quit date: 2001   Vaping Use    Vaping Use: Never used   Substance and Sexual Activity    Alcohol use: Not Currently     Comment: quit 2000; 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\"    Drug use: No    Sexual activity: Defer   Other Topics Concern    Not on file   Social History Narrative    Not on file     Social Determinants of Health     Financial Resource Strain: Not on file   Food Insecurity: Not on file   Transportation Needs: Not on file "   Physical Activity: Not on file   Stress: Not on file   Social Connections: Not on file   Intimate Partner Violence: Not on file   Housing Stability: Not on file        PCP:  BHUMI TADEO MD     ALLERGIES:   Allergies as of 10/12/2022 - Reviewed 05/13/2022   Allergen Reaction Noted    Shrimp  03/21/2017        MEDICATIONS:   Current Outpatient Medications   Medication Sig Dispense Refill    omeprazole (PriLOSEC) 20 mg tablet,delayed release (DR/EC) Take 20 mg by mouth daily      calcium carbonate-vitamin D3 600 mg-10 mcg (400 unit) per tablet Take 1 tablet by mouth daily      acetaminophen (TYLENOL) 325 mg tablet Take 650 mg by mouth every 6 (six) hours as needed      ondansetron ODT (ZOFRAN-ODT) 4 mg disintegrating tablet Take 4 mg by mouth every 8 (eight) hours as needed for nausea or vomiting      atorvastatin (LIPITOR) 10 mg tablet Take 10 mg by mouth every evening      losartan-hydrochlorothiazide (HYZAAR) 100-12.5 mg per tablet Take 1 tablet by mouth daily      omega-3 fatty acids-fish oil (Fish OiL) 340-1,000 mg capsule Take 1 capsule by mouth daily      cyanocobalamin 1,000 mcg tablet Take 1,000 mcg by mouth daily      amLODIPine (NORVASC) 10 mg tablet Take 10 mg by mouth every evening      citalopram (CeleXA) 20 mg tablet Take 20 mg by mouth daily      metoprolol succinate XL (TOPROL-XL) 25 mg 24 hr tablet Take 25 mg by mouth daily       No current facility-administered medications for this visit.       REVIEW OF SYSTEMS:   Review of Systems   Constitutional: Negative.    HENT:  Negative.     Eyes: Negative.    Respiratory: Negative.     Cardiovascular: Negative.    Gastrointestinal: Negative.    Endocrine: Negative.    Genitourinary: Negative.     Musculoskeletal: Negative.    Skin: Negative.    Neurological: Negative.    Hematological: Negative.    Psychiatric/Behavioral: Negative.       The ECOG performance status today is 0 - Fully active without restriction.          Objective    PHYSICAL  EXAM:   There were no vitals taken for this visit.   There is no height or weight on file to calculate BMI.     Physical Exam  Vitals and nursing note reviewed.   Constitutional:       Appearance: He is well-developed.   HENT:      Head: Normocephalic and atraumatic.      Right Ear: External ear normal.      Left Ear: External ear normal.      Nose: Nose normal.   Eyes:      Pupils: Pupils are equal, round, and reactive to light.   Cardiovascular:      Rate and Rhythm: Normal rate.      Heart sounds: Normal heart sounds.   Pulmonary:      Effort: Pulmonary effort is normal.      Breath sounds: Normal breath sounds.   Abdominal:      General: Bowel sounds are normal.      Palpations: Abdomen is soft.   Musculoskeletal:         General: Normal range of motion.      Cervical back: Normal range of motion and neck supple.   Skin:     General: Skin is warm and dry.   Neurological:      Mental Status: He is alert and oriented to person, place, and time.      Deep Tendon Reflexes: Reflexes are normal and symmetric.   Psychiatric:         Behavior: Behavior normal.         Thought Content: Thought content normal.         Judgment: Judgment normal.              IMAGING:  No results found.     LABORATORY STUDIES:  Results for orders placed or performed in visit on 10/12/22   CBC w/auto differential Blood, Venous   Result Value Ref Range    WBC 7.8 3.7 - 9.6 10*3/uL    RBC 4.50 4.10 - 5.80 10*6/µL    Hemoglobin 13.5 13.2 - 17.2 g/dL    Hematocrit 40.3 38.0 - 50.0 %    MCV 89.5 82.0 - 97.0 fL    MCH 29.9 29.0 - 34.0 pg    MCHC 33.4 32.0 - 36.0 g/dL    RDW 13.3 11.5 - 15.0 %    Platelets 195 130 - 350 10*3/uL    MPV 8.3 6.9 - 10.8 fL    Neutrophils% 44.2 (L) 46.0 - 70.0 %    Lymphocytes% 38.5 15.0 - 47.0 %    Monocytes% 13.5 (H) 5.0 - 13.0 %    Eosinophils% 2.6 0.0 - 3.0 %    Basophils% 1.2 0.0 - 2.0 %    ANC (auto diff) 3.40 10*3/UL    Lymphocytes Absolute 3.00 10*3/uL    Monocytes Absolute 1.10 10*3/uL    Eosinophils Absolute  0.20 10*3/uL    Basophils Absolute 0.10 10*3/uL   Comprehensive metabolic panel Blood, Venous   Result Value Ref Range    Sodium 136 135 - 145 mmol/L    Potassium 4.2 3.5 - 5.1 MMOL/L    Chloride 102 98 - 107 mmol/L    CO2 26 21 - 32 mmol/L    Anion Gap 8 3 - 11 mmol/L    BUN 34 (H) 7 - 25 mg/dL    Creatinine 1.56 (H) 0.70 - 1.30 mg/dL    Glucose 84 70 - 105 mg/dL    Calcium 9.4 8.6 - 10.3 mg/dL    AST 16 <40 U/L    ALT (SGPT) 11 7 - 52 U/L    Alkaline Phosphatase 89 45 - 115 U/L    Total Protein 7.2 6.0 - 8.3 g/dL    Albumin 4.3 3.5 - 5.3 g/dL    Total Bilirubin 1.12 0.20 - 1.40 mg/dL    Corrected Calcium 9.2 8.6 - 10.3 mg/dL    eGFR 47 (L) >60 mL/min/1.73m*2   LDH (Lactate dehydrogenase) Blood, Venous   Result Value Ref Range     87 - 246 U/L       Assessment/Plan    IMPRESSION/PLAN:   The encounter diagnosis was Diffuse large B-cell lymphoma of solid organ excluding spleen (CMS/HCC) (HCC).        Diffuse large B-cell lymphoma: We reviewed with him today his blood count, LDH and metabolic panel.  Blood count was completely normal as was his LDH.   His metabolic panel did show a slight bump in his creatinine and BUN.  He will follow-up with his primary care provider on this.  He has no clinical signs of recurrent disease.    Colon cancer stage I: Recommend follow-up colonoscopy in 1 year which will be about March.  He will probably have this done in [place].    Follow-up with us in 6 months he can call with questions or concerns in the interim.  Physician: Merced Vargas, CNP

## 2022-10-14 ASSESSMENT — ENCOUNTER SYMPTOMS
CARDIOVASCULAR NEGATIVE: 1
ENDOCRINE NEGATIVE: 1
HEMATOLOGIC/LYMPHATIC NEGATIVE: 1
RESPIRATORY NEGATIVE: 1
EYES NEGATIVE: 1
CONSTITUTIONAL NEGATIVE: 1
MUSCULOSKELETAL NEGATIVE: 1
PSYCHIATRIC NEGATIVE: 1
NEUROLOGICAL NEGATIVE: 1
GASTROINTESTINAL NEGATIVE: 1

## 2023-01-12 ENCOUNTER — TELEPHONE (OUTPATIENT)
Dept: ONCOLOGY | Facility: CLINIC | Age: 73
End: 2023-01-12
Payer: COMMERCIAL

## 2023-01-12 DIAGNOSIS — C83.398 DIFFUSE LARGE B-CELL LYMPHOMA OF SOLID ORGAN EXCLUDING SPLEEN: Primary | ICD-10-CM

## 2023-01-12 NOTE — TELEPHONE ENCOUNTER
Patient states that he is due for a colonoscopy in March, which correlates with Merced Vargas's last note. Patient would prefer the referral be sent to Dr. Caldwell. I advised that I would place the referral and send it over today. If the patient has not heard from them to schedule in the next couple of weeks, he should reach out to their office. He verbalized understanding. [place]

## 2023-01-12 NOTE — TELEPHONE ENCOUNTER
If patient/family calling feels like their symptoms are emergent they need to go the emergency department.  Nursing will return their call as soon as possible within a 24 hour period (usually before the end of working hours).       Patient: Rivera Juarez    Contact: [phone]    Name of person calling: Rivera    Facility:    Provider: Martha Almeida    Current Treatment:    Symptoms:    Reason for call: Patient called wanting to speak to a nurse about scheduling a colonoscopy. Please call    RX to be refilled:    What Pharmacy?

## 2023-01-19 ENCOUNTER — TELEPHONE (OUTPATIENT)
Dept: SURGERY | Facility: CLINIC | Age: 73
End: 2023-01-19
Payer: COMMERCIAL

## 2023-01-19 DIAGNOSIS — Z12.11 COLON CANCER SCREENING: Primary | ICD-10-CM

## 2023-03-14 RX ORDER — ASPIRIN 325 MG
325 TABLET, DELAYED RELEASE (ENTERIC COATED) ORAL DAILY
COMMUNITY

## 2023-03-14 NOTE — PRE-PROCEDURE INSTRUCTIONS
Pre-Surgery Instructions:   Medication Instructions    aspirin 325 mg EC tablet Patient is going to call prescriber for hold recommendations  1 week prior to procedure.     omeprazole (PriLOSEC) 20 mg tablet,delayed release (DR/EC) Take as prescribed do not hold    calcium carbonate-vitamin D3 600 mg-10 mcg (400 unit) per tablet Do not take morning of surgery/procedure    acetaminophen (TYLENOL) 325 mg tablet Take as needed morning of surgery/procedure    ondansetron ODT (ZOFRAN-ODT) 4 mg disintegrating tablet Take as needed morning of surgery/procedure    atorvastatin (LIPITOR) 10 mg tablet Do not take morning of surgery/procedure    losartan-hydrochlorothiazide (HYZAAR) 100-12.5 mg per tablet Take morning of surgery/procedure    omega-3 fatty acids-fish oil (Fish OiL) 340-1,000 mg capsule Stop taking 1 week prior to surgery/procedure    cyanocobalamin 1,000 mcg tablet Do not take morning of surgery/procedure    amLODIPine (NORVASC) 10 mg tablet Take morning of surgery/procedure    citalopram (CeleXA) 20 mg tablet Do not take morning of surgery/procedure    metoprolol succinate XL (TOPROL-XL) 25 mg 24 hr tablet Take morning of surgery/procedure   Patient will take 3 heart/bp meds am of procedure, will call aspirin prescriber for instructions. He will take all other daily meds after procedure when he can eat.      Diet instructions for surgery:  Nothing to eat after Midnight Monday    Preop questionnaire:  Preop Questionairre  Does patient have physical restrictions or limitations?: No  Has patient had an upper respiratory tract infection in the last 2 weeks?: No  Home Oxygen: No    Preop instructions:  Pre-op Phone Call  Recommend eye glasses for day of surgery, contact lenses must be removed prior to surgery:: Yes  Instructed to bring CPAP mask: N/A  Instructed to bring Oxygen tank from home: N/A  Does the patient wear a glucose monitoring device?: N/A  Instructed to remove all jewelry, including piercings, and  leave at home.: Yes  Instructed to leave all valuables at home: Yes  Instructed to leave all medications at home: Yes  Instructed to bring a valid photo ID and insurance card:: Yes  NPO Status Reinforced: Yes  Instruct patient to ensure transportation is available following surgery/procedure:: Yes  Instruct patient that a caregiver must stay with the patient 24 hours following anesthesia:: Yes

## 2023-03-20 ENCOUNTER — ANESTHESIA EVENT (OUTPATIENT)
Dept: GASTROENTEROLOGY | Facility: HOSPITAL | Age: 73
End: 2023-03-20
Payer: COMMERCIAL

## 2023-03-21 ENCOUNTER — HOSPITAL ENCOUNTER (OUTPATIENT)
Facility: HOSPITAL | Age: 73
Setting detail: OUTPATIENT SURGERY
Discharge: HOME | End: 2023-03-21
Attending: SURGERY | Admitting: SURGERY
Payer: COMMERCIAL

## 2023-03-21 ENCOUNTER — ANESTHESIA (OUTPATIENT)
Dept: GASTROENTEROLOGY | Facility: HOSPITAL | Age: 73
End: 2023-03-21
Payer: COMMERCIAL

## 2023-03-21 VITALS
DIASTOLIC BLOOD PRESSURE: 87 MMHG | SYSTOLIC BLOOD PRESSURE: 123 MMHG | RESPIRATION RATE: 20 BRPM | OXYGEN SATURATION: 93 % | TEMPERATURE: 97.4 F | HEART RATE: 66 BPM

## 2023-03-21 DIAGNOSIS — Z12.11 COLON CANCER SCREENING: ICD-10-CM

## 2023-03-21 PROCEDURE — (BLANK) HC RECOVERY PHASE-2 1ST 1/2 HOUR ACUITY LEVEL 1: Performed by: SURGERY

## 2023-03-21 PROCEDURE — 2720000000 HC SUPP 272 WO HCPCS: Performed by: SURGERY

## 2023-03-21 PROCEDURE — 99100 ANES PT EXTEME AGE<1 YR&>70: CPT | Performed by: NURSE ANESTHETIST, CERTIFIED REGISTERED

## 2023-03-21 PROCEDURE — 45385 COLONOSCOPY W/LESION REMOVAL: CPT | Mod: 33 | Performed by: SURGERY

## 2023-03-21 PROCEDURE — (BLANK) HC MAC ANESTHESIA FACILITY CHARGE 1ST 15 MIN: Performed by: SURGERY

## 2023-03-21 PROCEDURE — (BLANK): Performed by: SURGERY

## 2023-03-21 PROCEDURE — 6360000200 HC RX 636 W HCPCS (ALT 250 FOR IP): Performed by: NURSE ANESTHETIST, CERTIFIED REGISTERED

## 2023-03-21 PROCEDURE — 2580000300 HC RX 258: Performed by: NURSE PRACTITIONER

## 2023-03-21 PROCEDURE — 00812 ANES LWR INTST SCR COLSC: CPT | Performed by: NURSE ANESTHETIST, CERTIFIED REGISTERED

## 2023-03-21 PROCEDURE — (BLANK) HC MAC ANESTHESIA FACILITY CHARGE EACH ADDITIONAL MIN: Performed by: SURGERY

## 2023-03-21 RX ORDER — SODIUM CHLORIDE, SODIUM LACTATE, POTASSIUM CHLORIDE, CALCIUM CHLORIDE 600; 310; 30; 20 MG/100ML; MG/100ML; MG/100ML; MG/100ML
50 INJECTION, SOLUTION INTRAVENOUS CONTINUOUS
Status: DISCONTINUED | OUTPATIENT
Start: 2023-03-21 | End: 2023-03-21 | Stop reason: HOSPADM

## 2023-03-21 RX ORDER — PROPOFOL 10 MG/ML
INJECTION, EMULSION INTRAVENOUS CONTINUOUS PRN
Status: DISCONTINUED | OUTPATIENT
Start: 2023-03-21 | End: 2023-03-21 | Stop reason: SURG

## 2023-03-21 RX ADMIN — SODIUM CHLORIDE, POTASSIUM CHLORIDE, SODIUM LACTATE AND CALCIUM CHLORIDE 50 ML/HR: 600; 310; 30; 20 INJECTION, SOLUTION INTRAVENOUS at 08:03

## 2023-03-21 RX ADMIN — PROPOFOL 160 MCG/KG/MIN: 10 INJECTION, EMULSION INTRAVENOUS at 08:28

## 2023-03-21 ASSESSMENT — ENCOUNTER SYMPTOMS
DYSRHYTHMIAS: 1
EXERCISE TOLERANCE: GOOD (4-7 METS)

## 2023-03-21 NOTE — ANESTHESIA PREPROCEDURE EVALUATION
"Pre-Procedure Assessment    Patient: Rivera Juarez, male, 72 y.o.    Ht Readings from Last 1 Encounters:   03/07/22 1.727 m (5' 8\")     Wt Readings from Last 1 Encounters:   10/12/22 82.7 kg (182 lb 6.4 oz)       Last Vitals  BP      Temp 36.7 °C (98 °F) (03/21/23 0737)    Pulse 73 (03/21/23 0737)   Resp 20 (03/21/23 0737)    SpO2      Pain Score         Problem list reviewed and Medical history reviewed           Airway   Mallampati: II  TM distance: >3 FB  Neck ROM: full      Dental      Comment: Pt states his teeth are \"well used and there arent very many left\".    Pulmonary - negative ROS and normal exam    breath sounds clear to auscultation  Cardiovascular - normal exam  Exercise tolerance: good (4-7 METS)  (+) hypertension, dysrhythmias,     Rhythm: regular  Rate: normal    Mental Status/Neuro/Psych    Pt is alert.      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\"      Hematology   WBC   Date Value Ref Range Status   10/12/2022 7.8 3.7 - 9.6 10*3/uL Final     RBC   Date Value Ref Range Status   10/12/2022 4.50 4.10 - 5.80 10*6/µL Final     MCV   Date Value Ref Range Status   10/12/2022 89.5 82.0 - 97.0 fL Final     Hemoglobin   Date Value Ref Range Status   10/12/2022 13.5 13.2 - 17.2 g/dL Final     Hematocrit   Date Value Ref Range Status   10/12/2022 40.3 38.0 - 50.0 % Final     Platelets   Date Value Ref Range Status   10/12/2022 195 130 - 350 10*3/uL Final      Coagulation   Protime   Date Value Ref Range Status   02/21/2017 14.1 12.3 - 14.8 SEC      PTT   Date Value Ref Range Status   12/21/2016 30 24 - 37 SEC      INR   Date Value Ref Range Status   02/21/2017 1.1 0.9 - 1.1       General " Chemistry   POC Glucose   Date Value Ref Range Status   03/07/2022 98 70 - 105 mg/dL Final     Calcium   Date Value Ref Range Status   10/12/2022 9.4 8.6 - 10.3 mg/dL Final     BUN   Date Value Ref Range Status   10/12/2022 34 (H) 7 - 25 mg/dL Final     Creatinine   Date Value Ref Range Status   10/12/2022 1.56 (H) 0.70 - 1.30 mg/dL Final     Glucose   Date Value Ref Range Status   10/12/2022 84 70 - 105 mg/dL Final     Sodium   Date Value Ref Range Status   10/12/2022 136 135 - 145 mmol/L Final     Potassium   Date Value Ref Range Status   10/12/2022 4.2 3.5 - 5.1 MMOL/L Final     Magnesium   Date Value Ref Range Status   02/24/2017 1.8 1.8 - 2.4 mg/dL      CO2   Date Value Ref Range Status   10/12/2022 26 21 - 32 mmol/L Final     Chloride   Date Value Ref Range Status   10/12/2022 102 98 - 107 mmol/L Final     Anesthesia Plan    ASA 3   NPO status reviewed: > 4 hours    MAC         Induction: intravenous                 Anesthetic plan and risks discussed with patient.

## 2023-03-21 NOTE — ANESTHESIA POSTPROCEDURE EVALUATION
Patient: Rivera Juarez    Procedure Summary     Date: 03/21/23 Room / Location: [facility] 01 / [facility]    Anesthesia Start: 0826 Anesthesia Stop:     Procedure: COLONOSCOPY (Anus) Diagnosis:       Colon cancer screening      (colon polyps)      (diverticulosis)      (patent anastomosis)    Surgeons: Kerwin Caldwell MD Responsible Provider: Britni Gomez CRNA    Anesthesia Type: MAC ASA Status: 3          Anesthesia Type: MAC    Last vitals  Vitals Value Taken Time   BP     Temp     Pulse     Resp     SpO2     0-10 Pain Score         Anesthesia Post Evaluation    Patient location during evaluation: PACU  Patient participation: complete - patient participated  Level of consciousness: awake and alert  Pain management: adequate  Airway patency: patent  Anesthetic complications: no  Cardiovascular status: acceptable  Respiratory status: acceptable and room air  Hydration status: acceptable  May dismiss recovered patient based on consultation with the appropriate physicians and/or meeting appropriate discharge criteria      Cosmetic?  This procedure is not cosmetic.

## 2023-03-21 NOTE — OP NOTE
Rivera Juarez  3/21/2023  [id number]      Pre procedure Diagnosis: History of colon cancer    Post Procedure Diagnosis: same      Procedure:  Colonoscopy with polypectomy    Indications:  History of colon cancer    Anesthesia:  MAC due to history of CVA, Abnormal EKG, Dysrhythmia     Staff:   Circulator: An Osei RN  Endo Technician: Sylvia Elder LPN    Estimated Blood Loss:  No blood loss documented.    Specimens:  Order Name Source Comment Collection Info Order Time   PATHOLOGY SPECIMEN (HISTOLOGY) Large Intestine, Transverse Colon  Collected By: Kerwin Caldwell MD 3/21/2023  8:37 AM         Findings:  1) widely patent ileo transverse anastomosis  2) 5 mm transverse polyp taken with cold snare  3) Mild pancolonic diverticulosis        Procedure:   The patient was brought to the endoscopy suite and placed in a left lateral decubitus position on the operative table. A time out to patient and procedure was performed. After adequate sedation a digital rectal exam was performed and noted to be negative without mass, lesions or tenderness. The colonoscope was then introduced and advanced to the level of the ileocolic anastomosis . The scope was then slowly withdrawn and mucosal surfaces inspected. The scope was retroflexed in the rectum and absence of anal verge pathology was noted.  The prep was adequate and visualization was adequate. Findings on withdrawal are as follows:     1) widely patent ileo transverse anastomosis  2) 5 mm transverse polyp taken with cold snare  3) Mild pancolonic diverticulosis.     Complications:  None    Kerwin Caldwell MD  Phone Number: [phone]    Plan:  Repeat 3 years.     Date: 3/21/2023  Time: 8:41 AM

## 2023-03-21 NOTE — H&P
Preoperative HP  Attending is Dr. Caldwell  Date: 3/21/2023   Patient: Rivera Juarez   MRN: [id number]      HISTORY OF PRESENT ILLNESS:  Rivera Juarez is a 72 y.o.  male who presents for screening colonoscopy.     Previous colonoscopy performed 1 year ago. History of R norm for colon cancer around that time.           Past Medical History:   Diagnosis Date    Dental disease     several missing    Diffuse large b-cell lymphoma, extranodal and solid organ sites (CMS/HCC) (HCC)     Dysrhythmia     Gastrointestinal disease     gall bladder stones w/ intermittent pain     GERD (gastroesophageal reflux disease)     Hypertension     Lymphoma (CMS/HCC) (HCC)     Personal history of transient ischemic attack     Stroke (CMS/HCC) (HCC) 12/2016    mild residual L lip numbness and L hand discoordination      Past Surgical History:   Procedure Laterality Date    BOWEL RESECTION  02/21/2017    Small    BOWEL RESECTION Right 3/7/2022    Procedure: LAPAROSCOPIC RIGHT HEMICOLECTOMY;  Surgeon: Lester Juarez MD;  Location: [facility] OR;  Service: General;  Laterality: Right;    CHOLECYSTECTOMY N/A 3/7/2022    Procedure: LAPAROSCOPIC CHOLECYSTECTOMY;  Surgeon: Lester Juarez MD;  Location: [facility] OR;  Service: General;  Laterality: N/A;  moved to 0800 per Polly at clinic. Contacted pt and pt was ok with new time.    FRACTURE SURGERY Right late '60's    wrist ORIF w/ bone grafting     Medications Prior to Admission   Medication Sig Dispense Refill Last Dose    aspirin 325 mg EC tablet Take 325 mg by mouth daily   3/20/2023    omeprazole (PriLOSEC) 20 mg tablet,delayed release (DR/EC) Take 20 mg by mouth daily   3/20/2023    calcium carbonate-vitamin D3 600 mg-10 mcg (400 unit) per tablet Take 1 tablet by mouth daily   3/20/2023    acetaminophen (TYLENOL) 325 mg tablet Take 650 mg by mouth every 6 (six) hours as needed   Past Week    ondansetron ODT (ZOFRAN-ODT) 4 mg disintegrating tablet Take 4 mg by mouth every 8 (eight) hours as  "needed for nausea or vomiting   Past Month    atorvastatin (LIPITOR) 10 mg tablet Take 10 mg by mouth every evening   3/20/2023    losartan-hydrochlorothiazide (HYZAAR) 100-12.5 mg per tablet Take 1 tablet by mouth daily   3/20/2023    omega-3 fatty acids-fish oil (Fish OiL) 340-1,000 mg capsule Take 1 capsule by mouth daily   3/20/2023    cyanocobalamin 1,000 mcg tablet Take 1,000 mcg by mouth daily   3/20/2023    amLODIPine (NORVASC) 10 mg tablet Take 10 mg by mouth every evening   3/20/2023    citalopram (CeleXA) 20 mg tablet Take 20 mg by mouth daily   3/20/2023    metoprolol succinate XL (TOPROL-XL) 25 mg 24 hr tablet Take 25 mg by mouth daily   3/21/2023     Current Facility-Administered Medications   Medication Dose Route Frequency Provider Last Rate Last Admin    LR infusion  50 mL/hr intravenous Continuous Zakia Stockton CNP 50 mL/hr at 03/21/23 0803 50 mL/hr at 03/21/23 0803     Allergies   Allergen Reactions    Iodine     Shrimp      throat closing, severe nausea  Is able to eat oysters but avoids other shellfish     Family Status   Relation Name Status    Mother  (Not Specified)    Father  (Not Specified)     Social History     Socioeconomic History    Marital status:    Tobacco Use    Smoking status: Never    Smokeless tobacco: Former     Types: Chew     Quit date: 2001   Vaping Use    Vaping Use: Never used   Substance and Sexual Activity    Alcohol use: Not Currently     Comment: quit 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 In addition I have reviewed their allergies and medications.     Resuscitation Status: full    REVIEW OF SYSTEMS:  10 points ROS positive per HPI, all other systems negative.  "     PHYSICAL EXAMINATION:  Vital Signs Last 24 Hours:  [unfilled]     [unfilled]    Constitutional: Awake, alert, no acute distress  Eyes:  Pupils are equal, round, reactive to light.  EOMI. No scleral icterus.  Conjunctiva are without injection.  ENMT: Mucous membranes moist, Dentition and gums are intact.   Neck: Soft, supple, trachea midline.    Endocrine: The thyroid is without masses and mobile with swallow.   Lymphatic: There is no cervical, submandibular, supraclavicular/infraclavicular, or inguinal adenopathy.  Respiratory: Lungs are clear to auscultation and percussion bilaterally.   Cardiovascular: Regular rate and rhythm. No murmurs, rubs, or gallops.  no bilateral lower extremity edema. 2+ bilateral radial, dorsalis pedis, and posterior tibial pulses. No carotid bruits on ascultation.  Abdomen: Non-distended, non-tender, normoactive bowel sounds present, No masses, umbilical or inguinal hernias, or flank tenderness. No hepatosplenomegaly. No abdominal bruit noted.  Rectal: External exam shows normal anal folds, no external lesions, digital exam shows normal sphincter and no internal masses.    Musculoskeletal: No spinal or CVA tenderness. Full range of motion in the upper and lower extremities.    Skin: No skin rashes or lesions to inspection.  No petechia.    Neurologic: Cranial nerves II through XII are grossly intact and symmetric. No motor deficits. Reflexes are 2+ in biceps, triceps, patella, and achilles. Toes downgoing.    Psychiatric: The patient is alert and oriented times 3.  The patient's affect is not blunted and mood is appropriate.    INVESTIGATIONS:      MEDICAL DECISION MAKING:  I have personally reviewed previous records    ASSESSMENT & PLAN:  72 y.o. male presenting for  screening colonoscopy.       I have thoroughly counseled the patient regarding their diagnosis. Please see the Procedure note for the indication for the procedure and who was present for the consent.      The patient  was informed that the proposed endoscopy involves placing a flexible lighted tube into the intestine and does offer a very high likelihood of completing a full examination of the proposed portion of the intestine for the purposes of diagnosing the cause of symptoms, guiding treatment, or preventing disease.  Please see the GI Procedure note for the endoscopy description documentation.    The patient was made aware of the risks of the procedure, including but not limited to perforation of the intestine, hemorrhage from the intestine, heart arrhythmias, infection, missed lesions, and a remote possibility of death.  Additional difficulties may be encountered during recovery to include abdominal pain, bleeding, nausea, and vomiting. Surgery may be required to correct these complications.     In the course of the evaluation I did discuss other therapeutic options with the patient, including radiologic studies and surgical exploration. The risks and benefits of these options were also discussed which include but are not limited to missed lesions or surgical complications.     Also discussed were possible problems or difficulties the patient may encounter if treatment was not pursued at this time. These include bleeding, death, or infection from undiagnosed disease.      The patient was informed that Dr. Caldwell will be primarily responsible for the procedure.  Assistance during the procedure and during hospitalization may also be provided by other physicians, nurses and technicians, some of whom may be students.      The patient was informed that some medical information or findings encountered in the course of this procedure may be utilized for research and/or teaching purposes. However, should this occur, their identity will be kept anonymous with the exception of criminal cases when evidence is required to be provided to legal authorities for forensic purposes.     The patient will be provided additional education  resources by the support staff including availability of an educational video.  If there are ever any questions regarding their diagnosis or the procedure, the patient is encouraged to ask.    All of the patient’s questions have been answered to their satisfaction and they have indicated a clear understanding of this discussion.    Kerwin Caldwell MD

## 2023-03-22 NOTE — RESULT ENCOUNTER NOTE
Please let Rivera Juarez know they had 1 adenomatous polyps on colonoscopy. Recommend repeat exam in 3 years given previous colon cancer

## 2023-05-17 ENCOUNTER — OFFICE VISIT (OUTPATIENT)
Dept: ONCOLOGY | Facility: CLINIC | Age: 73
End: 2023-05-17
Payer: COMMERCIAL

## 2023-05-17 ENCOUNTER — LAB (OUTPATIENT)
Dept: ONCOLOGY | Facility: CLINIC | Age: 73
End: 2023-05-17
Payer: COMMERCIAL

## 2023-05-17 VITALS
HEART RATE: 88 BPM | WEIGHT: 186.8 LBS | DIASTOLIC BLOOD PRESSURE: 80 MMHG | TEMPERATURE: 97.8 F | OXYGEN SATURATION: 95 % | BODY MASS INDEX: 28.4 KG/M2 | SYSTOLIC BLOOD PRESSURE: 142 MMHG

## 2023-05-17 DIAGNOSIS — C83.398 DIFFUSE LARGE B-CELL LYMPHOMA OF SOLID ORGAN EXCLUDING SPLEEN: ICD-10-CM

## 2023-05-17 LAB
ALBUMIN SERPL-MCNC: 4.3 G/DL (ref 3.5–5.3)
ALP SERPL-CCNC: 91 U/L (ref 45–115)
ALT SERPL-CCNC: 13 U/L (ref 7–52)
ANION GAP SERPL CALC-SCNC: 7 MMOL/L (ref 3–11)
AST SERPL-CCNC: 18 U/L
BASOPHILS # BLD AUTO: 0.1 10*3/UL
BASOPHILS NFR BLD AUTO: 1.3 % (ref 0–2)
BILIRUB SERPL-MCNC: 1.16 MG/DL (ref 0.2–1.4)
BUN SERPL-MCNC: 25 MG/DL (ref 7–25)
CALCIUM ALBUM COR SERPL-MCNC: 9.1 MG/DL (ref 8.6–10.3)
CALCIUM SERPL-MCNC: 9.3 MG/DL (ref 8.6–10.3)
CHLORIDE SERPL-SCNC: 102 MMOL/L (ref 98–107)
CO2 SERPL-SCNC: 27 MMOL/L (ref 21–32)
CREAT SERPL-MCNC: 1.3 MG/DL (ref 0.7–1.3)
EOSINOPHIL # BLD AUTO: 0.2 10*3/UL
EOSINOPHIL NFR BLD AUTO: 3.3 % (ref 0–3)
ERYTHROCYTE [DISTWIDTH] IN BLOOD BY AUTOMATED COUNT: 13.2 % (ref 11.5–15)
GFR SERPL CREATININE-BSD FRML MDRD: 59 ML/MIN/1.73M*2
GLUCOSE SERPL-MCNC: 100 MG/DL (ref 70–105)
HCT VFR BLD AUTO: 38.9 % (ref 38–50)
HGB BLD-MCNC: 13.9 G/DL (ref 13.2–17.2)
LDH SERPL L TO P-CCNC: 147 U/L (ref 87–246)
LYMPHOCYTES # BLD AUTO: 2.5 10*3/UL
LYMPHOCYTES NFR BLD AUTO: 35.4 % (ref 15–47)
MCH RBC QN AUTO: 31.5 PG (ref 29–34)
MCHC RBC AUTO-ENTMCNC: 35.6 G/DL (ref 32–36)
MCV RBC AUTO: 88.5 FL (ref 82–97)
MONOCYTES # BLD AUTO: 0.9 10*3/UL
MONOCYTES NFR BLD AUTO: 13.3 % (ref 5–13)
NEUTROPHILS # BLD AUTO: 3.3 10*3/UL
NEUTROPHILS NFR BLD AUTO: 46.7 % (ref 46–70)
PLATELET # BLD AUTO: 201 10*3/UL (ref 130–350)
PMV BLD AUTO: 8 FL (ref 6.9–10.8)
POTASSIUM SERPL-SCNC: 4.6 MMOL/L (ref 3.5–5.1)
PROT SERPL-MCNC: 7.4 G/DL (ref 6–8.3)
RBC # BLD AUTO: 4.4 10*6/ΜL (ref 4.1–5.8)
SODIUM SERPL-SCNC: 136 MMOL/L (ref 135–145)
WBC # BLD AUTO: 7 10*3/UL (ref 3.7–9.6)

## 2023-05-17 PROCEDURE — 83615 LACTATE (LD) (LDH) ENZYME: CPT

## 2023-05-17 PROCEDURE — 36415 COLL VENOUS BLD VENIPUNCTURE: CPT

## 2023-05-17 PROCEDURE — 80053 COMPREHEN METABOLIC PANEL: CPT

## 2023-05-17 PROCEDURE — 85025 COMPLETE CBC W/AUTO DIFF WBC: CPT

## 2023-05-17 PROCEDURE — 99213 OFFICE O/P EST LOW 20 MIN: CPT | Performed by: NURSE PRACTITIONER

## 2023-05-17 PROCEDURE — G0463 HOSPITAL OUTPT CLINIC VISIT: HCPCS

## 2023-05-17 ASSESSMENT — ENCOUNTER SYMPTOMS
CONSTITUTIONAL NEGATIVE: 1
CARDIOVASCULAR NEGATIVE: 1
RESPIRATORY NEGATIVE: 1
HEMATOLOGIC/LYMPHATIC NEGATIVE: 1
MUSCULOSKELETAL NEGATIVE: 1
PSYCHIATRIC NEGATIVE: 1
NEUROLOGICAL NEGATIVE: 1
GASTROINTESTINAL NEGATIVE: 1
ENDOCRINE NEGATIVE: 1
EYES NEGATIVE: 1

## 2023-05-17 NOTE — PROGRESS NOTES
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  Patient received 6 cycles of R-CHOP in 2017.     Patient underwent right hemicolectomy in March 2022.  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  Stage consistent with pT2pN0.   Last visit at [facility] was October 12, 2022.      Since he was last seen at cancer care he has had no changes in his health.  He states that his energy level is good.  He walks around 4 miles a day on most days.  He denies any fatigue, weight loss, night sweats, swollen lymph glands or other constitutional symptoms.  He has not had any acute illnesses or hospitalizations over the last six months. He reports he did have a colonoscopy performed in March by Dr. Caldwell and he was told that he did not need to follow-up for another 3 years as they saw no evidence of recurrent cancer.   Subjective   HISTORY OF PRESENT ILLNESS:      Treatment History:  Oncology History    No history exists.   The patient was diagnosed with non-Hodgkin's lymphoma when he underwent surgery for a small-bowel obstruction in February 2017.  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  His PET scan showed FDG avid large mesenteric mass.  His bone marrow biopsy and brain imagings were negative.  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  Our purpose was to eradicate the high-grade component of his lymphoma.  He received 6 cycles of R-CHOP between March and July 2017.  The PET scan done in August 2017 showed complete response.       Patient underwent right hemicolectomy in March 2022.  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  Overall stage I.         PAST MEDICAL  HISTORY:   Past Medical History:   Diagnosis Date    Dental disease     several missing    Diffuse large b-cell lymphoma, extranodal and solid organ sites (CMS/HCC)     Dysrhythmia     Gastrointestinal disease     gall bladder stones w/ intermittent pain     GERD (gastroesophageal reflux disease)     Hypertension     Lymphoma (CMS/HCC)     Personal history of transient ischemic attack     Stroke (CMS/HCC) 12/2016    mild residual L lip numbness and L hand discoordination         PAST SURGICAL HISTORY:   Past Surgical History:   Procedure Laterality Date    BOWEL RESECTION  02/21/2017    Small    BOWEL RESECTION Right 03/07/2022    Procedure: LAPAROSCOPIC RIGHT HEMICOLECTOMY;  Surgeon: Lester Juraez MD;  Location: [facility] OR;  Service: General;  Laterality: Right;    CHOLECYSTECTOMY N/A 03/07/2022    Procedure: LAPAROSCOPIC CHOLECYSTECTOMY;  Surgeon: Lester Juarez MD;  Location: [facility] OR;  Service: General;  Laterality: N/A;  moved to 0800 per Polly at clinic. Contacted pt and pt was ok with new time.    COLONOSCOPY N/A 03/21/2023    1 adenomatous polyps on colonoscopy. Recommend repeat exam in 3 years given previous colon cancer Dr. Caldwell    FRACTURE SURGERY Right late '60's    wrist ORIF w/ bone grafting        GYNECOLOGICAL HISTORY (if applicable):     FAMILY HISTORY:   Family History   Problem Relation Age of Onset    Multiple sclerosis Mother     Heart disease Mother     Lung cancer Father     Prostate cancer Father     Cancer Father         SOCIAL HISTORY:   Social History     Socioeconomic History    Marital status:      Spouse name: Not on file    Number of children: Not on file    Years of education: Not on file    Highest education level: Not on file   Occupational History    Not on file   Tobacco Use    Smoking status: Never    Smokeless tobacco: Former     Types: Chew     Quit date: 2001   Vaping Use    Vaping Use: Never used   Substance and Sexual Activity    Alcohol use: Not Currently      "Comment: quit 2000; 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\"    Drug use: No    Sexual activity: Defer   Other Topics Concern    Not on file   Social History Narrative    Not on file     Social Determinants of Health     Financial Resource Strain: Not on file   Food Insecurity: Not on file   Transportation Needs: Not on file   Physical Activity: Not on file   Stress: Not on file   Social Connections: Not on file   Intimate Partner Violence: Not on file   Housing Stability: Not on file        PCP:  Abi Woodard MD     ALLERGIES:   Allergies as of 05/17/2023 - Reviewed 05/17/2023   Allergen Reaction Noted    Iodine  03/21/2023    Shrimp  03/21/2017        MEDICATIONS:   Current Outpatient Medications   Medication Sig Dispense Refill    aspirin 325 mg EC tablet Take 325 mg by mouth daily      omeprazole (PriLOSEC) 20 mg tablet,delayed release (DR/EC) Take 20 mg by mouth daily      calcium carbonate-vitamin D3 600 mg-10 mcg (400 unit) per tablet Take 1 tablet by mouth daily      acetaminophen (TYLENOL) 325 mg tablet Take 650 mg by mouth every 6 (six) hours as needed      ondansetron ODT (ZOFRAN-ODT) 4 mg disintegrating tablet Take 4 mg by mouth every 8 (eight) hours as needed for nausea or vomiting      atorvastatin (LIPITOR) 10 mg tablet Take 10 mg by mouth every evening      losartan-hydrochlorothiazide (HYZAAR) 100-12.5 mg per tablet Take 1 tablet by mouth daily      omega-3 fatty acids-fish oil (Fish OiL) 340-1,000 mg capsule Take 1 capsule by mouth daily      cyanocobalamin 1,000 mcg tablet Take 1,000 mcg by mouth daily      amLODIPine (NORVASC) 10 mg tablet Take 10 mg by mouth every evening      citalopram (CeleXA) 20 mg tablet Take 20 mg by mouth daily      metoprolol succinate XL (TOPROL-XL) 25 mg 24 hr tablet Take 25 mg by mouth daily       No current facility-administered medications for this visit.       REVIEW OF SYSTEMS:   Review of Systems   Constitutional: Negative.    HENT:  Negative.     Eyes: Negative.  "   Respiratory: Negative.     Cardiovascular: Negative.    Gastrointestinal: Negative.    Endocrine: Negative.    Genitourinary: Negative.     Musculoskeletal: Negative.    Skin: Negative.    Neurological: Negative.    Hematological: Negative.    Psychiatric/Behavioral: Negative.         The ECOG performance status today is 0 - Fully active without restriction.          Objective    PHYSICAL EXAM:   There were no vitals taken for this visit.   There is no height or weight on file to calculate BMI.     Physical Exam  Vitals and nursing note reviewed.   Constitutional:       Appearance: He is well-developed.   HENT:      Head: Normocephalic and atraumatic.      Right Ear: External ear normal.      Left Ear: External ear normal.      Nose: Nose normal.   Eyes:      Pupils: Pupils are equal, round, and reactive to light.   Cardiovascular:      Rate and Rhythm: Normal rate.      Heart sounds: Normal heart sounds.   Pulmonary:      Effort: Pulmonary effort is normal.      Breath sounds: Normal breath sounds.   Abdominal:      General: Bowel sounds are normal.      Palpations: Abdomen is soft.   Musculoskeletal:         General: Normal range of motion.      Cervical back: Normal range of motion and neck supple.   Skin:     General: Skin is warm and dry.   Neurological:      Mental Status: He is alert and oriented to person, place, and time.      Deep Tendon Reflexes: Reflexes are normal and symmetric.   Psychiatric:         Behavior: Behavior normal.         Thought Content: Thought content normal.         Judgment: Judgment normal.                IMAGING:  No results found.     LABORATORY STUDIES:  Results for orders placed or performed in visit on 05/17/23   CBC w/auto differential Blood, Venous   Result Value Ref Range    WBC 7.0 3.7 - 9.6 10*3/uL    RBC 4.40 4.10 - 5.80 10*6/µL    Hemoglobin 13.9 13.2 - 17.2 g/dL    Hematocrit 38.9 38.0 - 50.0 %    MCV 88.5 82.0 - 97.0 fL    MCH 31.5 29.0 - 34.0 pg    MCHC 35.6 32.0 -  36.0 g/dL    RDW 13.2 11.5 - 15.0 %    Platelets 201 130 - 350 10*3/uL    MPV 8.0 6.9 - 10.8 fL    Neutrophils% 46.7 46.0 - 70.0 %    Lymphocytes% 35.4 15.0 - 47.0 %    Monocytes% 13.3 (H) 5.0 - 13.0 %    Eosinophils% 3.3 (H) 0.0 - 3.0 %    Basophils% 1.3 0.0 - 2.0 %    ANC (auto diff) 3.30 10*3/UL    Lymphocytes Absolute 2.50 10*3/uL    Monocytes Absolute 0.90 10*3/uL    Eosinophils Absolute 0.20 10*3/uL    Basophils Absolute 0.10 10*3/uL   Comprehensive metabolic panel Blood, Venous   Result Value Ref Range    Sodium 136 135 - 145 mmol/L    Potassium 4.6 3.5 - 5.1 MMOL/L    Chloride 102 98 - 107 mmol/L    CO2 27 21 - 32 mmol/L    Anion Gap 7 3 - 11 mmol/L    BUN 25 7 - 25 mg/dL    Creatinine 1.30 0.70 - 1.30 mg/dL    Glucose 100 70 - 105 mg/dL    Calcium 9.3 8.6 - 10.3 mg/dL    AST 18 <40 U/L    ALT (SGPT) 13 7 - 52 U/L    Alkaline Phosphatase 91 45 - 115 U/L    Total Protein 7.4 6.0 - 8.3 g/dL    Albumin 4.3 3.5 - 5.3 g/dL    Total Bilirubin 1.16 0.20 - 1.40 mg/dL    Corrected Calcium 9.1 8.6 - 10.3 mg/dL    eGFR 59 (L) >60 mL/min/1.73m*2   LDH (Lactate dehydrogenase) Blood, Venous   Result Value Ref Range     87 - 246 U/L       Assessment/Plan    IMPRESSION/PLAN:   The encounter diagnosis was Diffuse large B-cell lymphoma of solid organ excluding spleen (CMS/HCC).   I reviewed with him his CBC, CMP and LDH which were all within normal limits.  He had a normal colonoscopy in March was suggested follow-up in 3 years.  Based on labs and clinical evidence there is no evidence of recurrence of his mantle cell lymphoma we will see him back in 6 months with labs prior.  He is encouraged to call with any concerns or new symptoms in the interim.       Physician: Merced Vargas, ALLYSON

## 2023-06-05 NOTE — PROCEDURE: MIPS QUALITY
When you have Diabetes…  Have your doctor check your feet regularly. Take off your shoes and socks for every doctor visit.  This includes PCP, endocrinologist, podiatrist, etc.  Wear the right shoes and socks. Don’t wear shoes that are too tight or pinch your toes.  Choose thick cotton socks and well cushioned shoes with plenty of room in the toes.  Never go barefoot.  Check your own feet twice daily.  Start with the nails and between the toes.  Inspect the entire top, sides, and bottom of your feet.  Look for open sores, severe dryness, cracking, and swelling.  If you notice any of these you should contact your podiatrist and make an appointment.  If you notice redness, swelling, increase in skin temperature, and/or drainage contact your podiatrist immediately and make an appointment ASAP.  If you have problems with vision or it is physically difficult to inspect your feet, have a family member or friend assist you, or use a mirror.    Treat cuts, scrapes, and blisters. If you do develop an open sore wash it gently with soap and water.  Do not break a blister.  Put antibiotic cream on the wound and cover with a dressing.  Contact your podiatrist ASAP to make an appointment.  Apply moisturizing creams. Do not put between the toes.   Trim toenails straight across. This will prevent ingrown toenails and infections.  If you cannot reach or you have numbness in your feet the podiatrist can perform toenail trimming every 3 months.    Keep your feet clean. Wash with soap and water every day.  Pat feet dry and be sure to dry between the toes.    Wear protective shoegear at all times when on your feet. Some diabetics cannot feel in their feet to know if they are stepping on something.  By wearing shoes you will protect your feet from foreign bodies.    Treat corns and calluses. People with diabetes may get thickened skin, calluses, or corns over bony spots on their feet.  Don’t trim or cut these at home.  See your 
podiatrist to have them treated.    Inspect your shoes before putting them on.  Shake them out or use your hand to remove any gino or other items that may have fallen in there.   Avoid heating pads and hot water foot soaks. Diabetes can hurt the nerves in your feet and you may be unable to fell if your feet are burning.    Follow instructions by your PCP.  Tight blood sugar control is the key to preventing limb threatening problems or infections.                                     [facility]  Date: 6/5/2023    Patient's Name and Address   Ayana Álvarez  [address] [address]   Patient's Date of Birth  1937     Prescriber's Name and Address    Dr. Tamara Segal  [address] [place] [address]  [phone]          Diagnosis (ICD-10 and/or Narrative)  1. Type 2 diabetes mellitus with diabetic peripheral angiopathy without gangrene, without long-term current use of insulin (CMD)  (primary encounter diagnosis)  Diabetic polyneuropathy associated with type 2 diabetes mellitus (CMD)    Prescriber's NPI  [id number]     Description of Item/Services Ordered:  Diabetic Shoes    Expected Length of Need: 99+ weeks    Prognosis: good    Medical Necessity:   1. Type 2 diabetes mellitus with diabetic peripheral angiopathy without gangrene, without long-term current use of insulin (CMD)    2. Diabetic polyneuropathy associated with type 2 diabetes mellitus (CMD)        I authorize the items/services shown above and certify that the information provided herein is true and accurate.      _______________________________  Dr. Tamara Segal, DPM   Date: 06/05/23                                                 [facility] ([facility])  [address]  [place] [address]  P: [phone]    [address]  [place] [address]  P: [phone]    [facility]  [address] [place] [address]  P: 
[phone]    [facility]  [address]  [place] [address]  P: [phone]  F: [phone]       
Quality 226: Preventive Care And Screening: Tobacco Use: Screening And Cessation Intervention: Tobacco Screening not Performed for Unknown Reasons
Quality 110: Preventive Care And Screening: Influenza Immunization: Influenza Immunization Administered during Influenza season
Detail Level: Zone
Quality 111:Pneumonia Vaccination Status For Older Adults: Pneumococcal Vaccination Previously Received

## 2023-10-16 ENCOUNTER — OFFICE VISIT (OUTPATIENT)
Dept: ONCOLOGY | Facility: CLINIC | Age: 73
End: 2023-10-16
Payer: COMMERCIAL

## 2023-10-16 ENCOUNTER — LAB (OUTPATIENT)
Dept: ONCOLOGY | Facility: CLINIC | Age: 73
End: 2023-10-16
Payer: COMMERCIAL

## 2023-10-16 VITALS
HEIGHT: 68 IN | DIASTOLIC BLOOD PRESSURE: 64 MMHG | BODY MASS INDEX: 27.11 KG/M2 | OXYGEN SATURATION: 96 % | SYSTOLIC BLOOD PRESSURE: 149 MMHG | HEART RATE: 70 BPM | TEMPERATURE: 98.9 F | WEIGHT: 178.9 LBS

## 2023-10-16 DIAGNOSIS — R79.89 ABNORMAL TSH: ICD-10-CM

## 2023-10-16 DIAGNOSIS — C83.398 DIFFUSE LARGE B-CELL LYMPHOMA OF SOLID ORGAN EXCLUDING SPLEEN: ICD-10-CM

## 2023-10-16 DIAGNOSIS — C83.398 DIFFUSE LARGE B-CELL LYMPHOMA OF SOLID ORGAN EXCLUDING SPLEEN: Primary | ICD-10-CM

## 2023-10-16 LAB
ALBUMIN SERPL-MCNC: 4.1 G/DL (ref 3.5–5.3)
ALP SERPL-CCNC: 102 U/L (ref 45–115)
ALT SERPL-CCNC: 9 U/L (ref 7–52)
ANION GAP SERPL CALC-SCNC: 9 MMOL/L (ref 3–11)
AST SERPL-CCNC: 17 U/L
BASOPHILS # BLD AUTO: 0.1 10*3/UL
BASOPHILS NFR BLD AUTO: 1 % (ref 0–2)
BILIRUB SERPL-MCNC: 1.1 MG/DL (ref 0.2–1.4)
BUN SERPL-MCNC: 19 MG/DL (ref 7–25)
CALCIUM ALBUM COR SERPL-MCNC: 9.5 MG/DL (ref 8.6–10.3)
CALCIUM SERPL-MCNC: 9.6 MG/DL (ref 8.6–10.3)
CHLORIDE SERPL-SCNC: 104 MMOL/L (ref 98–107)
CO2 SERPL-SCNC: 24 MMOL/L (ref 21–32)
CREAT SERPL-MCNC: 1.34 MG/DL (ref 0.7–1.3)
EGFRCR SERPLBLD CKD-EPI 2021: 57 ML/MIN/1.73M*2
EOSINOPHIL # BLD AUTO: 0.2 10*3/UL
EOSINOPHIL NFR BLD AUTO: 2.8 % (ref 0–3)
ERYTHROCYTE [DISTWIDTH] IN BLOOD BY AUTOMATED COUNT: 12.9 % (ref 11.5–15)
GLUCOSE SERPL-MCNC: 98 MG/DL (ref 70–105)
HCT VFR BLD AUTO: 39.1 % (ref 38–50)
HGB BLD-MCNC: 13.9 G/DL (ref 13.2–17.2)
LDH SERPL L TO P-CCNC: 138 U/L (ref 87–246)
LYMPHOCYTES # BLD AUTO: 2.6 10*3/UL
LYMPHOCYTES NFR BLD AUTO: 37.8 % (ref 15–47)
MCH RBC QN AUTO: 31.6 PG (ref 29–34)
MCHC RBC AUTO-ENTMCNC: 35.6 G/DL (ref 32–36)
MCV RBC AUTO: 89 FL (ref 82–97)
MONOCYTES # BLD AUTO: 0.9 10*3/UL
MONOCYTES NFR BLD AUTO: 12.7 % (ref 5–13)
NEUTROPHILS # BLD AUTO: 3.1 10*3/UL
NEUTROPHILS NFR BLD AUTO: 45.7 % (ref 46–70)
PLATELET # BLD AUTO: 195 10*3/UL (ref 130–350)
PMV BLD AUTO: 8.4 FL (ref 6.9–10.8)
POTASSIUM SERPL-SCNC: 4 MMOL/L (ref 3.5–5.1)
PROT SERPL-MCNC: 6.9 G/DL (ref 6–8.3)
RBC # BLD AUTO: 4.4 10*6/ΜL (ref 4.1–5.8)
SODIUM SERPL-SCNC: 137 MMOL/L (ref 135–145)
T3FREE SERPL-MCNC: 3.23 PG/ML (ref 2–3.5)
T4 FREE SERPL-MCNC: 0.71 NG/DL (ref 0.65–1.44)
TSH SERPL DL<=0.05 MIU/L-ACNC: 5.01 UIU/ML (ref 0.34–4.82)
WBC # BLD AUTO: 6.8 10*3/UL (ref 3.7–9.6)

## 2023-10-16 PROCEDURE — 84481 FREE ASSAY (FT-3): CPT

## 2023-10-16 PROCEDURE — 36415 COLL VENOUS BLD VENIPUNCTURE: CPT

## 2023-10-16 PROCEDURE — 84439 ASSAY OF FREE THYROXINE: CPT

## 2023-10-16 PROCEDURE — 84443 ASSAY THYROID STIM HORMONE: CPT

## 2023-10-16 PROCEDURE — G0463 HOSPITAL OUTPT CLINIC VISIT: HCPCS

## 2023-10-16 PROCEDURE — 83615 LACTATE (LD) (LDH) ENZYME: CPT

## 2023-10-16 PROCEDURE — 80053 COMPREHEN METABOLIC PANEL: CPT

## 2023-10-16 PROCEDURE — 85025 COMPLETE CBC W/AUTO DIFF WBC: CPT

## 2023-10-16 PROCEDURE — 99213 OFFICE O/P EST LOW 20 MIN: CPT | Performed by: NURSE PRACTITIONER

## 2023-10-16 RX ORDER — NYSTATIN 100000 U/G
1 CREAM TOPICAL AS NEEDED
COMMUNITY
Start: 2023-10-12

## 2023-10-16 RX ORDER — HYDROCORTISONE 25 MG/G
CREAM TOPICAL 2 TIMES DAILY
COMMUNITY
Start: 2023-10-12

## 2023-10-16 ASSESSMENT — ENCOUNTER SYMPTOMS: DIARRHEA: 1

## 2023-10-16 ASSESSMENT — PAIN SCALES - GENERAL: PAINLEVEL: 0-NO PAIN

## 2023-10-16 NOTE — PROGRESS NOTES
Oncology Progress Note      Chief Complaint:    Rivera Juarez is a 72 y.o. 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    History of Present Illness:    The patient was diagnosed with non-Hodgkin's lymphoma when he underwent surgery for a small-bowel obstruction in February 2017.  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  His PET scan showed FDG avid large mesenteric mass.  His bone marrow biopsy and brain imagings were negative.  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  Our purpose was to eradicate the high-grade component of his lymphoma.  He received 6 cycles of R-CHOP between March and July 2017.  The PET scan done in August 2017 showed complete response.      Late 2021 he was noted to have iron deficiency anemia.  March 2022 patient underwent right hemicolectomy.  Pathology showed invasive moderately differentiated adenocarcinoma with mucinous features.  Pathologic stage T2N0 or Stage I.  No adjuvant therapy indicated.  No surveillance imaging indicated.  Colonoscopy 2023 was normal.  Repeat in 3 years.    He is here today for routine follow-up surveillance.  He has felt well overall.  He has been eating healthy.  He also went a period of time eating more soft foods after teeth were pulled.  He attributes 8 pound weight loss since his last visit to that.  He has not had any recent infections.  No further new concerns.    Significant Comorbidity:   Past Medical History:   Diagnosis Date    Dental disease     several missing    Diffuse large b-cell lymphoma, extranodal and solid organ sites (CMS/HCC)     Dysrhythmia     Gastrointestinal disease     gall bladder stones w/ intermittent pain     GERD (gastroesophageal reflux disease)     Hypertension   "   Lymphoma (CMS/HCC)     Personal history of transient ischemic attack     Stroke (CMS/HCC) 12/2016    mild residual L lip numbness and L hand discoordination      Past Surgical History:   Procedure Laterality Date    BOWEL RESECTION  02/21/2017    Small    BOWEL RESECTION Right 03/07/2022    Procedure: LAPAROSCOPIC RIGHT HEMICOLECTOMY;  Surgeon: Lester Juarez MD;  Location: [facility] OR;  Service: General;  Laterality: Right;    CHOLECYSTECTOMY N/A 03/07/2022    Procedure: LAPAROSCOPIC CHOLECYSTECTOMY;  Surgeon: Lester Juarez MD;  Location: [facility] OR;  Service: General;  Laterality: N/A;  moved to 0800 per Polly at clinic. Contacted pt and pt was ok with new time.    COLONOSCOPY N/A 03/21/2023    1 adenomatous polyps on colonoscopy. Recommend repeat exam in 3 years given previous colon cancer Dr. 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\"    Drug use: No    Sexual activity: Defer   Other Topics Concern    Not on file   Social History Narrative    Not on file     Social Determinants of Health     Financial Resource Strain: Not on file   Food Insecurity: Not on file   Transportation Needs: Not on file   Physical Activity: Not on file   Stress: Not on file   Social Connections: Not on file   Intimate Partner Violence: Not on file   Housing Stability: Not on file     Current Medications:  Current Outpatient Medications:     aspirin 325 mg EC tablet, Take 1 tablet (325 mg total) by " "mouth daily, Disp: , Rfl:     omeprazole (PriLOSEC) 20 mg tablet,delayed release (DR/EC), Take 1 tablet (20 mg total) by mouth daily, Disp: , Rfl:     calcium carbonate-vitamin D3 600 mg-10 mcg (400 unit) per tablet, Take 1 tablet by mouth daily, Disp: , Rfl:     acetaminophen (TYLENOL) 325 mg tablet, Take 2 tablets (650 mg total) by mouth every 6 (six) hours as needed, Disp: , Rfl:     losartan-hydrochlorothiazide (HYZAAR) 100-12.5 mg per tablet, Take 1 tablet by mouth daily, Disp: , Rfl:     omega-3 fatty acids-fish oil (Fish OiL) 340-1,000 mg capsule, Take 1 capsule by mouth daily, Disp: , Rfl:     cyanocobalamin 1,000 mcg tablet, Take 1 tablet (1,000 mcg total) by mouth daily, Disp: , Rfl:     amLODIPine (NORVASC) 10 mg tablet, Take 1 tablet (10 mg total) by mouth every evening, Disp: , Rfl:     metoprolol succinate XL (TOPROL-XL) 25 mg 24 hr tablet, Take 1 tablet (25 mg total) by mouth daily, Disp: , Rfl:     ondansetron ODT (ZOFRAN-ODT) 4 mg disintegrating tablet, Take 1 tablet (4 mg total) by mouth every 8 (eight) hours as needed for nausea or vomiting, Disp: , Rfl:     Allergies:  Allergies   Allergen Reactions    Iodine     Shrimp      throat closing, severe nausea  Is able to eat oysters but avoids other shellfish     Review of Systems:  Reported ECOG performance status of: 0 - Asymptomatic  Review of Systems   Gastrointestinal:  Positive for diarrhea (rare with certain foods).   Skin:         Seeing dermatology.   Neurological:         Mild tremor of left hand when tired.   All other systems reviewed and are negative.    PAIN: He denies any pain.    Physical Exam:  /64   Pulse 70   Temp 37.2 °C (98.9 °F) (Temporal)   Ht 1.727 m (5' 8\")   Wt 81.1 kg (178 lb 14.4 oz)   SpO2 96%   BMI 27.20 kg/m²   Physical Exam  Constitutional:       Comments: Well appearing and in no acute distress.   Eyes:      Conjunctiva/sclera: Conjunctivae normal.      Comments: Sclera anicteric.   Cardiovascular:      " Rate and Rhythm: Normal rate and regular rhythm.      Heart sounds: S1 normal and S2 normal. No murmur heard.     No friction rub. No gallop.   Pulmonary:      Effort: Pulmonary effort is normal.      Breath sounds: Normal breath sounds. No wheezing, rhonchi or rales.   Abdominal:      General: Bowel sounds are normal. There is no distension.      Palpations: Abdomen is soft. There is no hepatomegaly or splenomegaly.      Tenderness: There is no abdominal tenderness.   Musculoskeletal:         General: Normal range of motion.      Right lower leg: No edema.      Left lower leg: No edema.   Lymphadenopathy:      Cervical: No cervical adenopathy.      Upper Body:      Right upper body: No supraclavicular or axillary adenopathy.      Left upper body: No supraclavicular or axillary adenopathy.   Skin:     General: Skin is warm and dry.      Findings: No ecchymosis or rash.      Nails: There is no clubbing.   Neurological:      Mental Status: He is alert and oriented to person, place, and time.      Comments: Exam non-focal.   Psychiatric:         Mood and Affect: Mood normal.         Behavior: Behavior normal.       Lab Studies:  CBC with differential:    Lab Results   Component Value Date    WBC 6.8 10/16/2023    HGB 13.9 10/16/2023    HCT 39.1 10/16/2023     10/16/2023    RBC 4.40 10/16/2023    MCV 89.0 10/16/2023    MCH 31.6 10/16/2023    MCHC 35.6 10/16/2023    RDW 12.9 10/16/2023    MPV 8.4 10/16/2023    NEUTROABS 3.10 10/16/2023    LYMPHSABS 2.60 10/16/2023     CMP:   Lab Results   Component Value Date     10/16/2023    K 4.0 10/16/2023     10/16/2023    CO2 24 10/16/2023    BUN 19 10/16/2023    CREATININE 1.34 (H) 10/16/2023    GLUCOSE 98 10/16/2023    CALCIUM 9.6 10/16/2023    PROT 6.9 10/16/2023    ALBUMIN 4.1 10/16/2023    AST 17 10/16/2023    ALT 9 10/16/2023    ALKPHOS 102 10/16/2023    BILITOT 1.10 10/16/2023     Lab Results   Component Value Date     10/16/2023     Imaging:  None  performed today.    Impression/Plan:  1.  Rivera Juarez is a 72 y.o. male with previously treated diffuse large B-cell lymphoma stage IE coinciding with low grade lymphoma:   He has no evidence of disease recurrence based on physical exam, review of systems, today's labs. He will continue to be followed in surveillance.   2.  Stage I colon cancer:  No evidence of recurrenct.  He completed March 2023 colonoscopy.  Plan to repeat in 3 years.  3.  Mild renal insufficiency:  We discussed maintaining adequate hydration.   4.  He will return for follow-up in 6 months with CBC, CMP, LDH labs.  He was advised to call with any questions or concerns in the interim.    On this date of service 20 minutes of total time was spent on this encounter.     Martha Almeida CNP

## 2024-04-15 ENCOUNTER — OFFICE VISIT (OUTPATIENT)
Dept: ONCOLOGY | Facility: CLINIC | Age: 74
End: 2024-04-15
Payer: COMMERCIAL

## 2024-04-15 ENCOUNTER — LAB (OUTPATIENT)
Dept: ONCOLOGY | Facility: CLINIC | Age: 74
End: 2024-04-15
Payer: COMMERCIAL

## 2024-04-15 VITALS
HEIGHT: 68 IN | TEMPERATURE: 97.9 F | BODY MASS INDEX: 27.77 KG/M2 | WEIGHT: 183.2 LBS | DIASTOLIC BLOOD PRESSURE: 76 MMHG | HEART RATE: 58 BPM | SYSTOLIC BLOOD PRESSURE: 144 MMHG | OXYGEN SATURATION: 99 %

## 2024-04-15 DIAGNOSIS — C83.398 DIFFUSE LARGE B-CELL LYMPHOMA OF SOLID ORGAN EXCLUDING SPLEEN: ICD-10-CM

## 2024-04-15 LAB
ALBUMIN SERPL-MCNC: 4.5 G/DL (ref 3.5–5.3)
ALP SERPL-CCNC: 83 U/L (ref 45–115)
ALT SERPL-CCNC: 8 U/L (ref 7–52)
ANION GAP SERPL CALC-SCNC: 10 MMOL/L (ref 3–11)
AST SERPL-CCNC: 15 U/L
BASOPHILS # BLD AUTO: 0 10*3/UL
BASOPHILS NFR BLD AUTO: 0.6 % (ref 0–2)
BILIRUB SERPL-MCNC: 1.4 MG/DL (ref 0.2–1.4)
BUN SERPL-MCNC: 20 MG/DL (ref 7–25)
CALCIUM ALBUM COR SERPL-MCNC: 9.2 MG/DL (ref 8.6–10.3)
CALCIUM SERPL-MCNC: 9.6 MG/DL (ref 8.6–10.3)
CHLORIDE SERPL-SCNC: 103 MMOL/L (ref 98–107)
CO2 SERPL-SCNC: 24 MMOL/L (ref 21–32)
CREAT SERPL-MCNC: 1.2 MG/DL (ref 0.7–1.3)
EGFRCR SERPLBLD CKD-EPI 2021: 64 ML/MIN/1.73M*2
EOSINOPHIL # BLD AUTO: 0.2 10*3/UL
EOSINOPHIL NFR BLD AUTO: 3.2 % (ref 0–3)
ERYTHROCYTE [DISTWIDTH] IN BLOOD BY AUTOMATED COUNT: 13.4 % (ref 11.5–15)
GLUCOSE SERPL-MCNC: 99 MG/DL (ref 70–105)
HCT VFR BLD AUTO: 41.1 % (ref 38–50)
HGB BLD-MCNC: 14.4 G/DL (ref 13.2–17.2)
LDH SERPL L TO P-CCNC: 149 U/L (ref 87–246)
LYMPHOCYTES # BLD AUTO: 2.5 10*3/UL
LYMPHOCYTES NFR BLD AUTO: 35 % (ref 15–47)
MCH RBC QN AUTO: 31.5 PG (ref 29–34)
MCHC RBC AUTO-ENTMCNC: 35.1 G/DL (ref 32–36)
MCV RBC AUTO: 89.7 FL (ref 82–97)
MONOCYTES # BLD AUTO: 0.9 10*3/UL
MONOCYTES NFR BLD AUTO: 12.9 % (ref 5–13)
NEUTROPHILS # BLD AUTO: 3.5 10*3/UL
NEUTROPHILS NFR BLD AUTO: 48.3 % (ref 46–70)
PLATELET # BLD AUTO: 209 10*3/UL (ref 130–350)
PMV BLD AUTO: 8.3 FL (ref 6.9–10.8)
POTASSIUM SERPL-SCNC: 3.9 MMOL/L (ref 3.5–5.1)
PROT SERPL-MCNC: 7.3 G/DL (ref 6–8.3)
RBC # BLD AUTO: 4.58 10*6/ΜL (ref 4.1–5.8)
SODIUM SERPL-SCNC: 137 MMOL/L (ref 135–145)
WBC # BLD AUTO: 7.2 10*3/UL (ref 3.7–9.6)

## 2024-04-15 PROCEDURE — G0463 HOSPITAL OUTPT CLINIC VISIT: HCPCS

## 2024-04-15 PROCEDURE — 83615 LACTATE (LD) (LDH) ENZYME: CPT | Performed by: NURSE PRACTITIONER

## 2024-04-15 PROCEDURE — 85025 COMPLETE CBC W/AUTO DIFF WBC: CPT | Performed by: NURSE PRACTITIONER

## 2024-04-15 PROCEDURE — 99213 OFFICE O/P EST LOW 20 MIN: CPT | Performed by: NURSE PRACTITIONER

## 2024-04-15 PROCEDURE — 36415 COLL VENOUS BLD VENIPUNCTURE: CPT | Performed by: NURSE PRACTITIONER

## 2024-04-15 PROCEDURE — 80053 COMPREHEN METABOLIC PANEL: CPT | Performed by: NURSE PRACTITIONER

## 2024-04-15 ASSESSMENT — ENCOUNTER SYMPTOMS
DIARRHEA: 1
APPETITE CHANGE: 1

## 2024-04-15 ASSESSMENT — PAIN SCALES - GENERAL: PAINLEVEL: 0-NO PAIN

## 2024-04-15 NOTE — PROGRESS NOTES
Oncology Progress Note      Chief Complaint:    Rivera Juarez is a 73 y.o. 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    History of Present Illness:    The patient was diagnosed with non-Hodgkin's lymphoma when he underwent surgery for a small-bowel obstruction in February 2017.  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  His PET scan showed FDG avid large mesenteric mass.  His bone marrow biopsy and brain imagings were negative.  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  Our purpose was to eradicate the high-grade component of his lymphoma.  He received 6 cycles of R-CHOP between March and July 2017.  The PET scan done in August 2017 showed complete response.      Late 2021 he was noted to have iron deficiency anemia.  March 2022 patient underwent right hemicolectomy.  Pathology showed invasive moderately differentiated adenocarcinoma with mucinous features.  Pathologic stage T2N0 or Stage I.  No adjuvant therapy indicated.  No surveillance imaging indicated.  Colonoscopy 2023 was normal.  Repeat in 3 years.    He is here today for routine follow-up surveillance.  He continues to feel well overall.  He denies any new concerns.  No recent infections or illnesses.    Significant Comorbidity:   Past Medical History:   Diagnosis Date    Dental disease     several missing    Diffuse large b-cell lymphoma, extranodal and solid organ sites (CMS/HCC)     Dysrhythmia     Gastrointestinal disease     gall bladder stones w/ intermittent pain     GERD (gastroesophageal reflux disease)     Hypertension     Lymphoma (CMS/HCC)     Personal history of transient ischemic attack     Stroke (CMS/HCC) 12/2016    mild residual L lip numbness and L hand discoordination   "    Past Surgical History:   Procedure Laterality Date    BOWEL RESECTION  02/21/2017    Small    BOWEL RESECTION Right 03/07/2022    Procedure: LAPAROSCOPIC RIGHT HEMICOLECTOMY;  Surgeon: Lester Juaerz MD;  Location: [facility] OR;  Service: General;  Laterality: Right;    CHOLECYSTECTOMY N/A 03/07/2022    Procedure: LAPAROSCOPIC CHOLECYSTECTOMY;  Surgeon: Lester Juarez MD;  Location: [facility] OR;  Service: General;  Laterality: N/A;  moved to 0800 per Polly at clinic. Contacted pt and pt was ok with new time.    COLONOSCOPY N/A 03/21/2023    1 adenomatous polyps on colonoscopy. Recommend repeat exam in 3 years given previous colon cancer Dr. 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\"    Drug use: No    Sexual activity: Defer   Other Topics Concern    Not on file   Social History Narrative    Not on file     Social Determinants of Health     Financial Resource Strain: Not on file   Food Insecurity: Not on file   Transportation Needs: Not on file   Physical Activity: Not on file   Stress: Not on file   Social Connections: Not on file   Intimate Partner Violence: Not on file   Housing Stability: Not on file     Current Medications:  Current Outpatient Medications:     nystatin (MYCOSTATIN) cream, Apply 1 Application topically as needed, Disp: , Rfl:     hydrocortisone 2.5 % cream, Apply topically 2 (two) times a day, Disp: , Rfl:     aspirin 325 mg EC tablet, Take 1 tablet (325 mg total) " "by mouth daily, Disp: , Rfl:     omeprazole (PriLOSEC) 20 mg tablet,delayed release (DR/EC), Take 1 tablet (20 mg total) by mouth daily, Disp: , Rfl:     calcium carbonate-vitamin D3 600 mg-10 mcg (400 unit) per tablet, Take 1 tablet by mouth daily, Disp: , Rfl:     acetaminophen (TYLENOL) 325 mg tablet, Take 2 tablets (650 mg total) by mouth every 6 (six) hours as needed, Disp: , Rfl:     ondansetron ODT (ZOFRAN-ODT) 4 mg disintegrating tablet, Take 1 tablet (4 mg total) by mouth every 8 (eight) hours as needed for nausea or vomiting, Disp: , Rfl:     losartan-hydrochlorothiazide (HYZAAR) 100-12.5 mg per tablet, Take 1 tablet by mouth daily, Disp: , Rfl:     omega-3 fatty acids-fish oil (Fish OiL) 340-1,000 mg capsule, Take 1 capsule by mouth daily, Disp: , Rfl:     cyanocobalamin 1,000 mcg tablet, Take 1 tablet (1,000 mcg total) by mouth daily, Disp: , Rfl:     amLODIPine (NORVASC) 10 mg tablet, Take 1 tablet (10 mg total) by mouth every evening, Disp: , Rfl:     metoprolol succinate XL (TOPROL-XL) 25 mg 24 hr tablet, Take 1 tablet (25 mg total) by mouth daily, Disp: , Rfl:     Allergies:  Allergies   Allergen Reactions    Iodine     Shrimp      throat closing, severe nausea  Is able to eat oysters but avoids other shellfish     Review of Systems:  Reported ECOG performance status of: 0 - Asymptomatic  Review of Systems   Constitutional:  Positive for appetite change (decreased from prior).   Gastrointestinal:  Positive for diarrhea (rare with certain foods).   Skin:         Seeing dermatology.   Neurological:         Mild tremor of left hand when tired.   All other systems reviewed and are negative.    PAIN: He denies any pain.    Physical Exam:  /76   Pulse 58   Temp 36.6 °C (97.9 °F) (Temporal)   Ht 1.727 m (5' 8\")   Wt 83.1 kg (183 lb 3.2 oz)   SpO2 99%   BMI 27.86 kg/m²   Physical Exam  Constitutional:       Comments: Well appearing and in no acute distress.   Eyes:      Conjunctiva/sclera: " Conjunctivae normal.      Comments: Sclera anicteric.   Cardiovascular:      Rate and Rhythm: Normal rate and regular rhythm.      Heart sounds: S1 normal and S2 normal. No murmur heard.     No friction rub. No gallop.   Pulmonary:      Effort: Pulmonary effort is normal.      Breath sounds: Normal breath sounds. No wheezing, rhonchi or rales.   Abdominal:      General: Bowel sounds are normal. There is no distension.      Palpations: Abdomen is soft. There is no hepatomegaly or splenomegaly.      Tenderness: There is no abdominal tenderness.   Musculoskeletal:         General: Normal range of motion.      Right lower leg: No edema.      Left lower leg: No edema.   Lymphadenopathy:      Cervical: No cervical adenopathy.      Upper Body:      Right upper body: No supraclavicular or axillary adenopathy.      Left upper body: No supraclavicular or axillary adenopathy.   Skin:     General: Skin is warm and dry.      Findings: No ecchymosis or rash.      Nails: There is no clubbing.   Neurological:      Mental Status: He is alert and oriented to person, place, and time.      Comments: Exam non-focal.   Psychiatric:         Mood and Affect: Mood normal.         Behavior: Behavior normal.       Lab Studies:  CBC with differential:    Lab Results   Component Value Date    WBC 7.2 04/15/2024    HGB 14.4 04/15/2024    HCT 41.1 04/15/2024     04/15/2024    RBC 4.58 04/15/2024    MCV 89.7 04/15/2024    MCH 31.5 04/15/2024    MCHC 35.1 04/15/2024    RDW 13.4 04/15/2024    MPV 8.3 04/15/2024    NEUTROABS 3.50 04/15/2024    LYMPHSABS 2.50 04/15/2024     CMP:   Lab Results   Component Value Date     04/15/2024    K 3.9 04/15/2024     04/15/2024    CO2 24 04/15/2024    BUN 20 04/15/2024    CREATININE 1.20 04/15/2024    GLUCOSE 99 04/15/2024    CALCIUM 9.6 04/15/2024    PROT 7.3 04/15/2024    ALBUMIN 4.5 04/15/2024    AST 15 04/15/2024    ALT 8 04/15/2024    ALKPHOS 83 04/15/2024    BILITOT 1.40 04/15/2024     Lab  Results   Component Value Date     04/15/2024     Imaging:  None performed today.    Impression/Plan:  1.  Rivera Juarez is a 73 y.o. male with previously treated diffuse large B-cell lymphoma stage IE coinciding with low grade lymphoma:   He has no evidence of disease recurrence based on physical exam, review of systems, today's labs. He will continue to be followed in surveillance.   2.  Stage I colon cancer:  No evidence of recurrenct.  He completed March 2023 colonoscopy.  Repeat March 2026.  3.  He will return for follow-up in 6 months with CBC, CMP, LDH labs.  He was advised to call with any questions or concerns in the interim.    On this date of service 20 minutes of total time was spent on this encounter.     Martha Almeida CNP

## 2024-06-11 NOTE — TELEPHONE ENCOUNTER
"Per Martha CNP \"Please let patient know his hemoglobin has dropped further to 11.1.  His white count and platelets are normal which is good.  I ordered further labs to evaluate for vitamin deficiency or other etiology.  He can have labs drawn when convenient for him, but hopefully this week.  Will call him back after these results again and go from there.\"    Discussed the plan of care with the patient he will return this Friday to have labs drawn and we will conduct another nurse lab review with the provider at that time.   "
Patient is on nurse lab review; resulted. Please advise.     
Detail Level: Detailed
General Sunscreen Counseling: I recommended a broad spectrum sunscreen with a SPF of 30 or higher.  I explained that SPF 30 sunscreens block approximately 97 percent of the sun's harmful rays.  Sunscreens should be applied at least 15 minutes prior to expected sun exposure and then every 2 hours after that as long as sun exposure continues. If swimming or exercising sunscreen should be reapplied every 45 minutes to an hour after getting wet or sweating.  One ounce, or the equivalent of a shot glass full of sunscreen, is adequate to protect the skin not covered by a bathing suit. I also recommended a lip balm with a sunscreen as well. Sun protective clothing can be used in lieu of sunscreen but must be worn the entire time you are exposed to the sun's rays.

## 2024-10-15 ENCOUNTER — OFFICE VISIT (OUTPATIENT)
Dept: ONCOLOGY | Facility: CLINIC | Age: 74
End: 2024-10-15
Payer: COMMERCIAL

## 2024-10-15 ENCOUNTER — LAB (OUTPATIENT)
Dept: ONCOLOGY | Facility: CLINIC | Age: 74
End: 2024-10-15
Payer: COMMERCIAL

## 2024-10-15 VITALS
OXYGEN SATURATION: 97 % | BODY MASS INDEX: 27.6 KG/M2 | HEART RATE: 54 BPM | TEMPERATURE: 98.2 F | DIASTOLIC BLOOD PRESSURE: 79 MMHG | SYSTOLIC BLOOD PRESSURE: 144 MMHG | HEIGHT: 68 IN | WEIGHT: 182.1 LBS | RESPIRATION RATE: 18 BRPM

## 2024-10-15 DIAGNOSIS — C83.398 DIFFUSE LARGE B-CELL LYMPHOMA OF SOLID ORGAN EXCLUDING SPLEEN: ICD-10-CM

## 2024-10-15 DIAGNOSIS — C85.10 LOW GRADE B-CELL LYMPHOMA (CMS/HCC): Primary | ICD-10-CM

## 2024-10-15 LAB
ALBUMIN SERPL-MCNC: 4.4 G/DL (ref 3.5–5.3)
ALP SERPL-CCNC: 97 U/L (ref 45–115)
ALT SERPL-CCNC: 6 U/L (ref 7–52)
ANION GAP SERPL CALC-SCNC: 8 MMOL/L (ref 3–11)
AST SERPL-CCNC: 15 U/L
BASOPHILS # BLD AUTO: 0.1 10*3/UL
BASOPHILS NFR BLD AUTO: 0.9 % (ref 0–2)
BILIRUB SERPL-MCNC: 1.59 MG/DL (ref 0.2–1.4)
BUN SERPL-MCNC: 22 MG/DL (ref 7–25)
CALCIUM ALBUM COR SERPL-MCNC: 9.4 MG/DL (ref 8.6–10.3)
CALCIUM SERPL-MCNC: 9.7 MG/DL (ref 8.6–10.3)
CHLORIDE SERPL-SCNC: 101 MMOL/L (ref 98–107)
CO2 SERPL-SCNC: 27 MMOL/L (ref 21–32)
CREAT SERPL-MCNC: 1.28 MG/DL (ref 0.7–1.3)
EGFRCR SERPLBLD CKD-EPI 2021: 59 ML/MIN/1.73M*2
EOSINOPHIL # BLD AUTO: 0.2 10*3/UL
EOSINOPHIL NFR BLD AUTO: 2.7 % (ref 0–3)
ERYTHROCYTE [DISTWIDTH] IN BLOOD BY AUTOMATED COUNT: 13.3 % (ref 11.5–15)
GLUCOSE SERPL-MCNC: 95 MG/DL (ref 70–105)
HCT VFR BLD AUTO: 40.5 % (ref 38–50)
HGB BLD-MCNC: 14.1 G/DL (ref 13.2–17.2)
LDH SERPL L TO P-CCNC: 142 U/L (ref 87–246)
LYMPHOCYTES # BLD AUTO: 2.5 10*3/UL
LYMPHOCYTES NFR BLD AUTO: 35.1 % (ref 15–47)
MCH RBC QN AUTO: 31.7 PG (ref 29–34)
MCHC RBC AUTO-ENTMCNC: 34.8 G/DL (ref 32–36)
MCV RBC AUTO: 91 FL (ref 82–97)
MONOCYTES # BLD AUTO: 0.9 10*3/UL
MONOCYTES NFR BLD AUTO: 12.8 % (ref 5–13)
NEUTROPHILS # BLD AUTO: 3.4 10*3/UL
NEUTROPHILS NFR BLD AUTO: 48.5 % (ref 46–70)
PLATELET # BLD AUTO: 189 10*3/UL (ref 130–350)
PMV BLD AUTO: 8 FL (ref 6.9–10.8)
POTASSIUM SERPL-SCNC: 4 MMOL/L (ref 3.5–5.1)
PROT SERPL-MCNC: 7.3 G/DL (ref 6–8.3)
RBC # BLD AUTO: 4.45 10*6/UL (ref 4.1–5.8)
SODIUM SERPL-SCNC: 136 MMOL/L (ref 135–145)
WBC # BLD AUTO: 7 10*3/UL (ref 3.7–9.6)

## 2024-10-15 PROCEDURE — G0463 HOSPITAL OUTPT CLINIC VISIT: HCPCS

## 2024-10-15 PROCEDURE — 85025 COMPLETE CBC W/AUTO DIFF WBC: CPT

## 2024-10-15 PROCEDURE — 80053 COMPREHEN METABOLIC PANEL: CPT

## 2024-10-15 PROCEDURE — 36415 COLL VENOUS BLD VENIPUNCTURE: CPT

## 2024-10-15 PROCEDURE — 83615 LACTATE (LD) (LDH) ENZYME: CPT

## 2024-10-15 PROCEDURE — 99213 OFFICE O/P EST LOW 20 MIN: CPT | Performed by: NURSE PRACTITIONER

## 2024-10-15 ASSESSMENT — ENCOUNTER SYMPTOMS
APPETITE CHANGE: 1
DIARRHEA: 1

## 2024-10-15 NOTE — PROGRESS NOTES
Oncology Progress Note      Chief Complaint:    Rivera Juarez is a 73 y.o. 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    History of Present Illness:    The patient was diagnosed with non-Hodgkin's lymphoma when he underwent surgery for a small-bowel obstruction in February 2017.  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  His PET scan showed FDG avid large mesenteric mass.  His bone marrow biopsy and brain imagings were negative.  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  Our purpose was to eradicate the high-grade component of his lymphoma.  He received 6 cycles of R-CHOP between March and July 2017.  The PET scan done in August 2017 showed complete response.      Late 2021 he was noted to have iron deficiency anemia.  March 2022 patient underwent right hemicolectomy.  Pathology showed invasive moderately differentiated adenocarcinoma with mucinous features.  Pathologic stage T2N0 or Stage I.  No adjuvant therapy indicated.  No surveillance imaging indicated.  Colonoscopy 2023 was normal.  Repeat in 3 years.    He is here today for routine follow-up surveillance.  He denies any concerns over the last year.  He continues to feel well overall.  No noticeable lymphadenopathy or GI symptoms.  His weight has been stable.  No recent infections.    Significant Comorbidity:   Past Medical History:   Diagnosis Date    Dental disease     several missing    Diffuse large b-cell lymphoma, extranodal and solid organ sites     Dysrhythmia     Gastrointestinal disease     gall bladder stones w/ intermittent pain     GERD (gastroesophageal reflux disease)     Hypertension     Lymphoma (CMS/HCC)     Personal history of transient ischemic attack   "   Stroke (CMS/HCC) 12/2016    mild residual L lip numbness and L hand discoordination      Past Surgical History:   Procedure Laterality Date    BOWEL RESECTION  02/21/2017    Small    BOWEL RESECTION Right 03/07/2022    Procedure: LAPAROSCOPIC RIGHT HEMICOLECTOMY;  Surgeon: Lester Juarez MD;  Location: [facility] OR;  Service: General;  Laterality: Right;    CHOLECYSTECTOMY N/A 03/07/2022    Procedure: LAPAROSCOPIC CHOLECYSTECTOMY;  Surgeon: Lester Juarez MD;  Location: [facility] OR;  Service: General;  Laterality: N/A;  moved to 0800 per Polly at clinic. Contacted pt and pt was ok with new time.    COLONOSCOPY N/A 03/21/2023    1 adenomatous polyps on colonoscopy. Recommend repeat exam in 3 years given previous colon cancer Dr. 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\"    Drug use: No    Sexual activity: Defer   Other Topics Concern    Not on file   Social History Narrative    Not on file     Social Determinants of Health     Financial Resource Strain: Not on file   Food Insecurity: Not on file   Transportation Needs: Not on file   Physical Activity: Not on file   Stress: Not on file   Social Connections: Not on file   Intimate Partner Violence: Not on file   Housing Stability: Not on file     Current Medications:  Current Outpatient Medications:     nystatin (MYCOSTATIN) cream, Apply 1 Application topically as needed, Disp: , Rfl:     hydrocortisone 2.5 % cream, Apply topically 2 " "(two) times a day, Disp: , Rfl:     aspirin 325 mg EC tablet, Take 1 tablet (325 mg total) by mouth daily, Disp: , Rfl:     omeprazole (PriLOSEC) 20 mg tablet,delayed release (DR/EC), Take 1 tablet (20 mg total) by mouth daily, Disp: , Rfl:     calcium carbonate-vitamin D3 600 mg-10 mcg (400 unit) per tablet, Take 1 tablet by mouth daily, Disp: , Rfl:     acetaminophen (TYLENOL) 325 mg tablet, Take 2 tablets (650 mg total) by mouth every 6 (six) hours as needed, Disp: , Rfl:     ondansetron ODT (ZOFRAN-ODT) 4 mg disintegrating tablet, Take 1 tablet (4 mg total) by mouth every 8 (eight) hours as needed for nausea or vomiting, Disp: , Rfl:     losartan-hydrochlorothiazide (HYZAAR) 100-12.5 mg per tablet, Take 1 tablet by mouth daily, Disp: , Rfl:     omega-3 fatty acids-fish oil (Fish OiL) 340-1,000 mg capsule, Take 1 capsule by mouth daily, Disp: , Rfl:     cyanocobalamin 1,000 mcg tablet, Take 1 tablet (1,000 mcg total) by mouth daily, Disp: , Rfl:     amLODIPine (NORVASC) 10 mg tablet, Take 1 tablet (10 mg total) by mouth every evening, Disp: , Rfl:     metoprolol succinate XL (TOPROL-XL) 25 mg 24 hr tablet, Take 1 tablet (25 mg total) by mouth daily, Disp: , Rfl:     Allergies:  Allergies   Allergen Reactions    Iodine     Shrimp      throat closing, severe nausea  Is able to eat oysters but avoids other shellfish     Review of Systems:  Reported ECOG performance status of: 0 - Asymptomatic  Review of Systems   Constitutional:  Positive for appetite change (decreased from prior).   Gastrointestinal:  Positive for diarrhea (rare with certain foods).   Skin:         Seeing dermatology.   All other systems reviewed and are negative.    PAIN: He denies any pain.    Physical Exam:  /79   Pulse 54   Temp 36.8 °C (98.2 °F) (Temporal)   Resp 18   Ht 1.727 m (5' 8\")   Wt 82.6 kg (182 lb 1.6 oz)   SpO2 97%   BMI 27.69 kg/m²   Physical Exam  Constitutional:       Comments: Well appearing and in no acute " distress.   Eyes:      Conjunctiva/sclera: Conjunctivae normal.      Comments: Sclera anicteric.   Cardiovascular:      Rate and Rhythm: Normal rate and regular rhythm.      Heart sounds: S1 normal and S2 normal. No murmur heard.     No friction rub. No gallop.   Pulmonary:      Effort: Pulmonary effort is normal.      Breath sounds: Normal breath sounds. No wheezing, rhonchi or rales.   Abdominal:      General: Bowel sounds are normal. There is no distension.      Palpations: Abdomen is soft. There is no hepatomegaly or splenomegaly.      Tenderness: There is no abdominal tenderness.   Musculoskeletal:         General: Normal range of motion.      Right lower leg: No edema.      Left lower leg: No edema.   Lymphadenopathy:      Cervical: No cervical adenopathy.      Upper Body:      Right upper body: No supraclavicular or axillary adenopathy.      Left upper body: No supraclavicular or axillary adenopathy.   Skin:     General: Skin is warm and dry.      Findings: No ecchymosis or rash.      Nails: There is no clubbing.   Neurological:      Mental Status: He is alert and oriented to person, place, and time.      Comments: Exam non-focal.   Psychiatric:         Mood and Affect: Mood normal.         Behavior: Behavior normal.         Lab Studies:  CBC with differential:    Lab Results   Component Value Date    WBC 7.0 10/15/2024    HGB 14.1 10/15/2024    HCT 40.5 10/15/2024     10/15/2024    RBC 4.45 10/15/2024    MCV 91.0 10/15/2024    MCH 31.7 10/15/2024    MCHC 34.8 10/15/2024    RDW 13.3 10/15/2024    MPV 8.0 10/15/2024    NEUTROABS 3.40 10/15/2024    LYMPHSABS 2.50 10/15/2024     CMP:   Lab Results   Component Value Date     10/15/2024    K 4.0 10/15/2024     10/15/2024    CO2 27 10/15/2024    BUN 22 10/15/2024    CREATININE 1.28 10/15/2024    GLUCOSE 95 10/15/2024    CALCIUM 9.7 10/15/2024    PROT 7.3 10/15/2024    ALBUMIN 4.4 10/15/2024    AST 15 10/15/2024    ALT 6 (L) 10/15/2024    ALKPHOS  97 10/15/2024    BILITOT 1.59 (H) 10/15/2024     Lab Results   Component Value Date     10/15/2024     Imaging:  None performed today.    Impression/Plan:  1.  Rivera Juarez is a 73 y.o. male with previously treated diffuse large B-cell lymphoma stage IE coinciding with low grade lymphoma:   He has no evidence of disease recurrence based on physical exam, review of systems, today's labs. He will continue to be followed in surveillance with concern for ongoing risk of recurrence with the low grade lymphoma.   2.  Stage I colon cancer:  No evidence of recurrence.  He completed March 2023 colonoscopy.  Repeat March 2026.  3.  He will return for follow-up in one year with CBC, CMP, LDH labs.  He was advised to call with any questions or concerns in the interim.    On this date of service 20 minutes of total time was spent on this encounter.     Martha Almeida, CNP

## (undated) DEVICE — KIT LAP SCOPE WARMER D-HELP

## (undated) DEVICE — CORD FEMALE BOVIE DISP @

## (undated) DEVICE — SUTURE PDSII 0 CT 36" VIOLET

## (undated) DEVICE — SNARE LOOP HEXAGONAL 2.4MM OD SHEATH 240CML 13MMW

## (undated) DEVICE — SPONGE LAP 18X18" W LOOP STL

## (undated) DEVICE — DRESSING TEGADERM 4X4 3/4

## (undated) DEVICE — BLADE CLIPPER 3M PROFESSIONAL USED W 0111359 RCRH OR ONLY

## (undated) DEVICE — COVER MAYO STAND XLG REINFORCE 30 1/2 X 57

## (undated) DEVICE — ELECTRODE L HOOK 5MM X 32CM

## (undated) DEVICE — POUCH ENDO CATCH 10MM

## (undated) DEVICE — RELOAD GIA CRV 45 TAN TRI STAPLE

## (undated) DEVICE — PREP SKIN CHLORAPREP ORNG 26ML STL

## (undated) DEVICE — SUTURE ETHILON 2-0 FSLX 30" BLACK

## (undated) DEVICE — BASIN LG DISPOSABLE

## (undated) DEVICE — SUTURE SILK 2-0 FS

## (undated) DEVICE — MANIFOLD 4 PORT NEPTUNE

## (undated) DEVICE — DRESSING TEGADERM 2 3/8X2 3/4

## (undated) DEVICE — TRAY LAP CHOLE CUSTOM RCRH CUSTOM PACK

## (undated) DEVICE — SUTURE ETHILON 2-0 FS 18" BLACK MONOFILAMENT NON ABS

## (undated) DEVICE — SUTURE SILK 2-0 SH CR BLK 30" PERMA HAND BLK

## (undated) DEVICE — SUTURE VICRYL 0 UR6 27" VIOLET

## (undated) DEVICE — CANNULA FIX 5MM STD UNIVERSAL

## (undated) DEVICE — SCISSOR TIP METZENBAUM 5MM CRV DISP

## (undated) DEVICE — SUTURE VICRYL 0 TIE 54" VIOLET

## (undated) DEVICE — SUTURE VICRYL 3-0 CT-1 36" UNDYED

## (undated) DEVICE — SUTURE PDSII 0 TP-1 60" LOOP VIOLET

## (undated) DEVICE — PENCIL ELECTROSURGICAL HAND-SWITCHING @

## (undated) DEVICE — DRESSING TEGADERM 6X8" +

## (undated) DEVICE — ADHESIVE MASTISOL LIQUID

## (undated) DEVICE — GLOVE SURG SZ 6 GREEN W/ ALOE VERA

## (undated) DEVICE — IRRIGATOR SUCTION STR HANDLE ELECTROSURGICAL W/HAND CONTROL

## (undated) DEVICE — TUBING INSUFFLATION DUAL CONNECTOR

## (undated) DEVICE — KITTNER ENDOSCOPIC 9101

## (undated) DEVICE — CLOSURE SKIN STERISTRIP 1/2" 3M

## (undated) DEVICE — TRAP POLYP E TRAP

## (undated) DEVICE — Device

## (undated) DEVICE — PAD BOVIE ADULT 9' CORD REM ELECTRODE PATIENT RETURN

## (undated) DEVICE — GLOVE BIOGEL PI IND SURGICAL SZ 7.5

## (undated) DEVICE — TROCAR BLADELESS 11MM STD OPT OPTICAL  W/FIX CANNULA VERSAONE

## (undated) DEVICE — RELOAD GIA 30 PURPLE TRI-STAPLE

## (undated) DEVICE — GLOVE SENSICARE 6.0 SURG

## (undated) DEVICE — LIGACLIP MED/LG LT300

## (undated) DEVICE — NEEDLE INSUFFLATION 150MM 13 GA

## (undated) DEVICE — GLOVE SENSICARE SLT 7.5 SURG STL POWDER FREE

## (undated) DEVICE — GOWN SURGICAL XL LEVEL 4

## (undated) DEVICE — LIGASURE L HOOK 44CM

## (undated) DEVICE — CONTAINER SPECIMEN 4OZ STL SCREW TOP BLISTER PACK

## (undated) DEVICE — RELOAD GIA 60MM PURPLE

## (undated) DEVICE — APPLIER ENDOCLIP III 5MM

## (undated) DEVICE — SPONGE GAUZE 2X2" 8PLY STL

## (undated) DEVICE — TROCAR BLADELESS 5MM VERSAPORT OPTICAL WITH FIXATION CANNULA

## (undated) DEVICE — TROCAR BLADELESS 12MM OPTICAL VERSAONE WITH FIX CANNULA

## (undated) DEVICE — SUTURE VICRYL 4-0 PS-2 18IN UNDYED

## (undated) DEVICE — STAPLER ENDO GIA STD HANDLE

## (undated) DEVICE — SUTURE SILK 3-0 SH 18" CR8

## (undated) DEVICE — TUBE SUCTION POOLE MULTIPURPOSE

## (undated) DEVICE — GLOVE SENSICARE 8.0 SURG

## (undated) DEVICE — SUTURE VLOC 90 2-0 GS-21 6" VIOLET

## (undated) DEVICE — RELOAD GIA 45MM TAN

## (undated) DEVICE — SPONGE GAUZE 4X4-12 STL 10'S

## (undated) DEVICE — GLOVE SENSICARE MICRO PF 7.0

## (undated) DEVICE — TUBING SUCTION 3/16"X10'

## (undated) DEVICE — SUTURE VLOC 90 2-0 GS-22 9" VIOLET

## (undated) DEVICE — DRESSING TELFA 8X3

## (undated) DEVICE — SUTURE VICRYL 2-0 CT-1 36" UNDYED

## (undated) DEVICE — CLAMP BABCOCK ENDO 10BB

## (undated) DEVICE — SYRINGE 12CC LUER LOCK TIP CTL MONOJECT / RING CONTROL

## (undated) DEVICE — ELECTRODE LAP 13 1/2" L HOOK 0118256

## (undated) DEVICE — GLOVE SENSICARE MICRO PF 7.5

## (undated) DEVICE — SPONGE GAUZE 12PLY 4X4 STL

## (undated) DEVICE — TRAY SURESTEP CATH STR TIP 16F 350ML URINE METER SILICONE